# Patient Record
Sex: FEMALE | Race: BLACK OR AFRICAN AMERICAN | Employment: OTHER | URBAN - METROPOLITAN AREA
[De-identification: names, ages, dates, MRNs, and addresses within clinical notes are randomized per-mention and may not be internally consistent; named-entity substitution may affect disease eponyms.]

---

## 2020-07-02 ENCOUNTER — HOSPITAL ENCOUNTER (OUTPATIENT)
Dept: PHYSICAL THERAPY | Age: 75
Discharge: HOME | End: 2020-07-02
Payer: MEDICARE

## 2020-07-02 PROCEDURE — 97161 PT EVAL LOW COMPLEX 20 MIN: CPT

## 2020-07-02 PROCEDURE — 97110 THERAPEUTIC EXERCISES: CPT

## 2020-07-02 NOTE — PROGRESS NOTES
PT DAILY TREATMENT NOTE/LUMBAR EVAL 10-18    Patient Name: Maren Joaquin  Date:2020  : 1945  [x]  Patient  Verified  Payor: [place] MEDICARE / Plan: [place] MEDICARE PART A & B / Product Type: Medicare /    In time:1228  Out time:125  Total Treatment Time (min): 25  Visit #: 1 of 16    Medicare/BCBS Only   Total Timed Codes (min):  25 1:1 Treatment Time:  25     Treatment Area: Lumbar pain [M54.5]  SUBJECTIVE  Pain Level (0-10 scale): 7  []constant []intermittent []improving [x]worsening []no change since onset    Any medication changes, allergies to medications, adverse drug reactions, diagnosis change, or new procedure performed?: [x] No    [] Yes (see summary sheet for update)  Subjective functional status/changes:     Patient has c/c of lateral right thigh and leg pain since May 2020 with no apparent reason for onset. Patient describes pain as strong shooting ache. Pain is worse in PM especially after a busy day. Denies numbness/tingling. Denies popping/clicking. Aggravating factors: worst primarily with standing >10 mins. Alleviating factors: sitting and trunk flexion tasks. Denies red flags: SOB, chest pain, dizziness/lightheadedness, blurred/double vision, HA, chills/fevers, night sweats, change in bowel/bladder control, abdominal pain, difficulty swallowing, slurred speech, unexplained weight gain/loss, nausea, vomiting. PMHx: DM, HTN. Surgical Hx: breast lumpectomy. Social Hx: , single level home, no alcohol, no tobacco, retired. PLOF: unrestricted in standing and walking tolerance. CLOF: standing/walking limited to 10 mins max, unable to grocery shop, moderate difficulty completing household ADLs, especially cooking.   Diagnostic Imaging: plain film x rays lumbar and thoracic DJD, DDD      OBJECTIVE/EXAMINATION    Precautions: none    25 min [x]Eval                  []Re-Eval       25 min Therapeutic Exercise:  [] See flow sheet :   Rationale: increase ROM, increase strength and decrease pain to improve the patients ability to complete ADLs          With   [] TE   [] TA   [] neuro   [] other: Patient Education: [x] Review HEP    [] Progressed/Changed HEP based on:   [] positioning   [] body mechanics   [] transfers   [] heat/ice application    [] other:      Physical Therapy Evaluation - Lumbar Spine    OBJECTIVE  Posture:  Lateral Shift: [] R    [] L     [] +  [] -  Kyphosis: [] Increased [] Decreased   []  WNL  Lordosis:  [] Increased [x] Decreased   [] WNL  Pelvic symmetry: [x] WNL    [] Other:    Gait:  [] Normal     [x] Abnormal: flexed posture, decreased chandler, difficulty rising from sit to stand    Active Movements: [] N/A   [] Too acute   [] Other:  ROM Degrees Comments:pain, area   Forward flexion  95   Fingertips 1 inch from floor   Extension  22    SB right  34    SB left  35    Rotation right  45    Rotation left  40   Mild pain     Repeated Movements: extension positive  Side Glide: negative   Sustained passive positioning test: right posterior quadrant positive    Neuro Screen [x] WNL  Myotome/Dermatome/Reflexes:  Comments:    Dural Mobility:  SLR Supine: [] R    [] L    [] +    [x] -  @ (degrees):   Slump Test: [] R    [] L    [] +    [x] -  @ (degrees):   Prone Knee Bend: [] R    [] L    [] +    [x] -     Palpation  [x] Min  [] Mod  [] Severe    Location: right piriformis  [x] Min  [] Mod  [] Severe    Location: right SI    Strength   L(0-5) R (0-5) N/T   Hip Flexion (L1,2) 5 5 []   Knee Extension (L3,4) 5 4 []   Ankle Dorsiflexion (L4) 5 5 []   Great Toe Extension (L5) 5 5 []   Ankle Plantarflexion (S1) 5 5 []   Knee Flexion (S1,2) 5 5 []   Abdominals 3-  []   Paraspinals 4 4 []   Back Rotators 4 4 []   Gluteus Anthony 4 4 []   Hip Abduction 4 5 []     Special Tests  Lumbar:  Lumb.  Compression: [x] Pos  [] Neg               Lumbar Distraction:   [] Pos  [x] Neg    Quadrant:  [x] Pos  [] Neg   [] Flex  [x] Ext    Sacroilliac:  Gaenslen's: [] R    [] L    [] +    [x] - Compression: [] +    [x] -     Gapping:  [] +    [x] -     Thigh Thrust: [] R    [] L    [] +    [] -          Hip: Hernán:  [x] R    [] L    [x] +    [] -     Scour:  [] R    [] L    [] +    [x] -     Piriformis: [x] R    [] L    [x] +    [] -     Other tests/comments:       Pain Level (0-10 scale) post treatment: 4    ASSESSMENT/Changes in Function: Patient presents with signs/symptoms consistent with xray confirmed lumbar DJD likely resulting in lumbar spinal stenosis and intermittent right LE radiculopathy. Patient presents with decreased trunk AROM, core strength, markedly limited standing tolerance and moderate to severe intermittent radicular pain. Patient will continue to benefit from skilled PT services to modify and progress therapeutic interventions, address functional mobility deficits, address ROM deficits, address strength deficits, analyze and address soft tissue restrictions, analyze and cue movement patterns, analyze and modify body mechanics/ergonomics and assess and modify postural abnormalities to attain remaining goals.      [x]  See Plan of Care  []  See progress note/recertification  []  See Discharge Summary         Progress towards goals / Updated goals:  See POC    PLAN  []  Upgrade activities as tolerated     [x]  Continue plan of care  []  Update interventions per flow sheet       []  Discharge due to:_  []  Other:_      Sangeeta Humphreys PT 7/2/2020  12:32 PM

## 2020-07-02 NOTE — PROGRESS NOTES
[facility] at [address] [place] [address] Drive: [phone]   Fax: [phone]  PLAN OF CARE / [address] [place] [address] FOR PHYSICAL THERAPY SERVICES  Patient Name: Aly Michael : 1945   Medical   Diagnosis: Low back pain [M54.5] Treatment Diagnosis: Low back pain   Onset Date: May 2020     Referral Source: CAITLIN Burns Start of Care [facility]): 2020   Prior Hospitalization: See medical history Provider #: [id number]   Prior Level of Function:  unrestricted in standing and walking tolerance   Comorbidities: DM, HTN   Medications: Verified on Patient Summary List   The Plan of Care and following information is based on the information from the initial evaluation.   ===========================================================================================  Assessment / childers information:  Aly Michael is a 66 y.o.  female who presents with  signs/symptoms consistent with xray confirmed lumbar DJD likely resulting in lumbar spinal stenosis and intermittent right LE radiculopathy. Patient presents with decreased trunk AROM, core strength, markedly limited standing tolerance and moderate to severe intermittent radicular pain.     Patient will continue to benefit from skilled PT services to modify and progress therapeutic interventions, address functional mobility deficits, address ROM deficits, address strength deficits, analyze and address soft tissue restrictions, analyze and cue movement patterns, analyze and modify body mechanics/ergonomics and assess and modify postural abnormalities to attain remaining goals.       Pt instructed in HEP and will f/u in clinic for PT.  ===========================================================================================  Eval Complexity: History MEDIUM  Complexity : 1-2 comorbidities / personal factors will impact the outcome/ POC ;  Examination  LOW Complexity : 1-2 Standardized tests and measures addressing body structure, function, activity limitation and / or participation in recreation ; Presentation LOW Complexity : Stable, uncomplicated ;  Decision Making MEDIUM Complexity : FOTO score of 26-74; : FOTO score = an established functional score where 100 = no disability  Overall Complexity LOW   Problem List: pain affecting function, decrease ROM, decrease strength, impaired gait/ balance, decrease ADL/ functional abilitiies, decrease activity tolerance, decrease flexibility/ joint mobility and decrease transfer abilities   Treatment Plan may include any combination of the following: Therapeutic exercise, Therapeutic activities, Neuromuscular re-education, Physical agent/modality, Gait/balance training, Manual therapy, Aquatic therapy, Patient education, Self Care training and Functional mobility training  Patient / Family readiness to learn indicated by: asking questions, trying to perform skills and interest  Persons(s) to be included in education: patient (P)  Barriers to Learning/Limitations: no  Measures Taken: NA  Patient Goal (s): \"I want to be able to stand and cook a whole meal without the pain. \"   Patient self reported health status: good  Rehabilitation Potential: good  Goals:  Short Term Goals: To be accomplished in 4 weeks:   Patient will report compliance with HEP 1x/day to aid in rehabilitation program.   Status at IE: Patient instructed in and provided written copy of initial Home Exercise Program.   Current: Same as IE      Patient will increase lumbar AROM extension to 30 degrees to aid in completion of ADLs. Status at IE: lumbar extension 22 degrees with increased pain. Current: Same as IE    Long Term Goals: To be accomplished in 8 weeks:   Patient will increase B LE strength to 5/5 MMT throughout to aid in completion of ADLs.    Status at IE: core/abdominal 3-/5, lumbar and gluts 4/5   Current: Same as IE     Patient will report pain no greater than 2/10 throughout entire day to aid in completion of ADLs. Status at IE:0-7/10   Current: Same as IE     Patent will be able to stand for 30 mins to be able to prepare meals. .   Status at IE: standing tolerance <10mins. Current: Same as IE     Patient will improve FOTO (an established functional score where 100 = no disability) score to 57 points to demonstrate improvement in functional status. Status at IE: 37   Current: Same as IE        Frequency / Duration:   Patient to be seen 2  times per week for 8  weeks:  Patient / Caregiver education and instruction: self care and exercises      . Therapist Signature: Angel Villa DPT Date: 0/3/8964   Certification Period: 7/2/2020 to 9/30/2020 Time: 1:36 PM   ===========================================================================================  I certify that the above Physical Therapy Services are being furnished while the patient is under my care. I agree with the treatment plan and certify that this therapy is necessary. Physician Signature:        Date:       Time:     Please sign and return to [facility] or you may fax the signed copy to [phone]. Thank you.

## 2020-07-09 ENCOUNTER — HOSPITAL ENCOUNTER (OUTPATIENT)
Dept: PHYSICAL THERAPY | Age: 75
Discharge: HOME | End: 2020-07-09
Payer: MEDICARE

## 2020-07-09 PROCEDURE — 97110 THERAPEUTIC EXERCISES: CPT

## 2020-07-09 NOTE — PROGRESS NOTES
PT DAILY TREATMENT NOTE    Patient Name: Lluvia Booth  Date:2020  : 1945  [x]  Patient  Verified  Payor: [place] MEDICARE / Plan: VA MEDICARE PART A & B / Product Type: Medicare /    In time: 229  Out time: 315  Total Treatment Time (min): 46  Total Timed Codes (min): 46  1:1 Treatment Time ( only): 42   Visit #: 2 of 16    Treatment Area: Low back pain [M54.5]    SUBJECTIVE  Pain Level (0-10 scale): 6  Any medication changes, allergies to medications, adverse drug reactions, diagnosis change, or new procedure performed?: [x] No    [] Yes (see summary sheet for update)  Subjective functional status/changes:   [] No changes reported  \"I have been doing the exercises at home and they do seem to help decrease the pain. \"    OBJECTIVE    42 min Therapeutic Exercise:  [x] See flow sheet :   Rationale: increase ROM, increase strength and decrease pain to improve the patients ability to complete ADLs  ambulation safety and efficiency in order to improve patient's ability to safely ambulate at home for self care. With   [] TE   [] TA   [] neuro   [] other: Patient Education: [x] Review HEP    [] Progressed/Changed HEP based on:   [] positioning   [] body mechanics   [] transfers   [] heat/ice application    [] other:      Other Objective/Functional Measures: NA     Pain Level (0-10 scale) post treatment: 4    ASSESSMENT/Changes in Function: Patient instructed in and provided written copy of additional exs to HEP. Patient fatigues quickly with gentle exs indicating significant deconditioning. Patient responds well to treatment session. No adverse effects were noted from today's treatment session.      Patient will continue to benefit from skilled PT services to modify and progress therapeutic interventions, address functional mobility deficits, address ROM deficits, address strength deficits, analyze and address soft tissue restrictions, analyze and cue movement patterns, analyze and modify body mechanics/ergonomics, assess and modify postural abnormalities,  and instruct in home and community integration to attain remaining goals. []  See Plan of Care  []  See progress note/recertification  []  See Discharge Summary         Progress towards goals / Updated goals:  Short Term Goals: To be accomplished in 4 weeks:                   Patient will report compliance with HEP 1x/day to aid in rehabilitation program.                   Status at IE: Patient instructed in and provided written copy of initial Home Exercise Program.                   Current: Patient reports daily compliance with initial HEP.   7/9/2020                      Patient will increase lumbar AROM extension to 30 degrees to aid in completion of ADLs. Status at IE: lumbar extension 22 degrees with increased pain. Current: Same as IE     Long Term Goals: To be accomplished in 8 weeks:                   Patient will increase B LE strength to 5/5 MMT throughout to aid in completion of ADLs. Status at IE: core/abdominal 3-/5, lumbar and gluts 4/5                   Current: Same as IE                      Patient will report pain no greater than 2/10 throughout entire day to aid in completion of ADLs. Status at IE:0-7/10                   Current: Same as IE                      Patent will be able to stand for 30 mins to be able to prepare meals. .                   Status at IE: standing tolerance <10mins. Current: Same as IE                      Patient will improve FOTO (an established functional score where 100 = no disability) score to 57 points to demonstrate improvement in functional status.                    Status at IE: 37                   Current: Same as IE       PLAN  []  Upgrade activities as tolerated     [x]  Continue plan of care  []  Update interventions per flow sheet       []  Discharge due to:_  []  Other:_      Brianna Mari PT, DPT 7/9/2020  2:39 PM    Future Appointments   Date Time Provider Eder Tressa   7/13/2020  1:30 PM Abimbola Montanez, PT [facility]   7/15/2020 12:30 PM Abimbola Montanez, PT [facility]   7/20/2020  9:00 AM Abimbola Montanez, PT [facility]   7/23/2020 12:30 PM Mason Tavares, PT [facility]   7/28/2020  1:30 PM Mason Tavares, PT [facility]   7/30/2020  1:30 PM Patricia Arreola, PT [facility]

## 2020-07-13 ENCOUNTER — HOSPITAL ENCOUNTER (OUTPATIENT)
Dept: PHYSICAL THERAPY | Age: 75
Discharge: HOME | End: 2020-07-13
Payer: MEDICARE

## 2020-07-13 PROCEDURE — 97110 THERAPEUTIC EXERCISES: CPT | Performed by: PHYSICAL THERAPIST

## 2020-07-13 NOTE — PROGRESS NOTES
PT DAILY TREATMENT NOTE    Patient Name: Ras Mix  Date:2020  : 1945  [x]  Patient  Verified  Payor: Ramona Watson / Plan: [place] MEDICARE PART A & B / Product Type: Medicare /    In time:1329  Out time:1425  Total Treatment Time (min): 56  Total Timed Codes (min): 56  1:1 Treatment Time ( only): 64   Visit #: 3 of 16    Treatment Area: Low back pain [M54.5]    SUBJECTIVE  Pain Level (0-10 scale): 5 right leg from lateral  Thigh up to lateral hip , no c/o low back localized pain. Any medication changes, allergies to medications, adverse drug reactions, diagnosis change, or new procedure performed?: [x] No    [] Yes (see summary sheet for update)  Subjective functional status/changes:   [] No changes reported  This weekend especially yesterday painful in her lateral leg up to 8-/10 took Aleve some help , no specific help with Knee to chest ex tried . Or sitting flexion against a ball , in past these helped some     OBJECTIVE  56 min Therapeutic Exercise:  [x]? See flow sheet :   Rationale: increase ROM, increase strength and decrease pain to improve the patients ability to complete ADLs  ambulation safety and efficiency in order to improve patient's ability to safely ambulate at home for self care. With   [] TE   [] TA   [] neuro   [] other: Patient Education: [x] Review HEP    [] Progressed/Changed HEP based on:   [] positioning   [] body mechanics   [] transfers   [] heat/ice application    [] other:      Other Objective/Functional Measures: sitting rotation trunk tighter to left , performed 2x 10 reps 5 sec hold to right and noted progressively easier motion to left following ex now approx same and WNL both directions.    No change on leg sx however neither better or worse     Sitting flexion = bilaterally no change in leg sx     pressup 75% no increase in pain or sx , unilateral pressup tight on right side some pain , no pain on left , performed 2x10 reps on left and 3x 20 sec holds ( responded best to reps and noted able to abolish leg sx and increase right unilateral motion as well as pressup mobility )   Pt then perform 10 reps pressups still no pain   And standing extension with no return of pain      Pain Level (0-10 scale) post treatment: 0    ASSESSMENT/Changes in Function: able to abolish leg sx with extension preference so held on flexion knees to chest for now just to fully assess tolerance and response to extension ex. Pt responded best with unilateral pressup , regular pressup and tolerated with no return of sx standing extension. Pt will continue these extension direction ex and reassess response at f/u appt in 2 days. Pt also advised in sitting to use pillow support for upright sit and work on walking as tolerated . Patient will continue to benefit from skilled PT services to modify and progress therapeutic interventions, address functional mobility deficits, address ROM deficits, address strength deficits, analyze and address soft tissue restrictions, analyze and cue movement patterns, analyze and modify body mechanics/ergonomics, assess and modify postural abnormalities and address imbalance/dizziness to attain remaining goals. [x]  See Plan of Care  []  See progress note/recertification  []  See Discharge Summary         Progress towards goals / Updated goals:  Short Term Goals: To be accomplished in 4 weeks:                   [facility] will report compliance with HEP 1x/day to aid in rehabilitation program.                   Status at IE: Patient instructed in and provided written copy of initial Home Exercise Program.                   Current: Patient reports daily compliance with initial HEP.   7/13/2020                      Patient will increase lumbar AROM extension to 30 degrees to aid in completion of ADLs.                    Status at IE: lumbar extension 22 degrees with increased pain.                   Current: 75% pressup with no pain progressing      Long Term Goals: To be accomplished in 8 weeks:                   Patient will increase B LE strength to 5/5 MMT throughout to aid in completion of ADLs.                  Status at IE: core/abdominal 3-/5, lumbar and gluts 4/5                   Current: Same as IE                      Patient will report pain no greater than 2/10 throughout entire day to aid in completion of ADLs.                  Status at IE:0-7/10                   Current: up to 8-9/10 over last 1-2 days                       Patent will be able to stand for 30 mins to be able to prepare meals. .                   Status at IE: standing tolerance <10mins.                  Current: Same as IE                      Patient will improve FOTO (an established functional score where 100 = no disability) score to 57 points to demonstrate improvement in functional status.                    Status at IE: 40                   Current: Same as IE    PLAN  [x]  Upgrade activities as tolerated     [x]  Continue plan of care  []  Update interventions per flow sheet       []  Discharge due to:_  []  Other:_      Kevin De La Garza, PT 7/13/2020  1:30 PM    Future Appointments   Date Time Provider Eder Bustillos   7/15/2020 12:30 PM Paula Goodman, PT [facility]   7/20/2020  9:00 AM Paula Goodman, PT [facility]   7/23/2020 12:30 PM Fatou Santizo, PT [facility]   7/28/2020  1:30 PM Jordin Terry, PT [facility]   7/30/2020  1:30 PM Delmis Zhang, PT, DPT [facility]

## 2020-07-15 ENCOUNTER — HOSPITAL ENCOUNTER (OUTPATIENT)
Dept: PHYSICAL THERAPY | Age: 75
Discharge: HOME | End: 2020-07-15
Payer: MEDICARE

## 2020-07-15 PROCEDURE — 97110 THERAPEUTIC EXERCISES: CPT | Performed by: PHYSICAL THERAPIST

## 2020-07-15 NOTE — PROGRESS NOTES
PT DAILY TREATMENT NOTE    Patient Name: Payton Begin  Date:7/15/2020  : 1945  [x]  Patient  Verified  [place]or: [place] MED[place]ARE / Plan: [place] MEDICARE PART A & B / Product Type: Medicare /    In time:1232 Out time:1320   Total Treatment Time (min): 52  Total Timed Codes (min): 52  1:1 Treatment Time (MC only): 52   Visit #: 4 of 16    Treatment Area: Low back pain [M54.5]    SUBJECTIVE  Pain Level (0-10 scale): 0  Any medication changes, allergies to medications, adverse drug reactions, diagnosis change, or new procedure performed?: [x] No    [] Yes (see summary sheet for update)  Subjective functional status/changes:   [] No changes reported  Pt states felt good after last appt till evening , usually at night when cooking dinner will have pain , feels better when she goes to sit down and rest. Pt able to perform extension ex pressups , unilateral pressups and standing extension on own with HEP and none of them caused her any pain , she did not have pain before starting them so she is not sure if they helped her pain. OBJECTIVE  52 min Therapeutic Exercise:  [x]? ? See flow sheet :   Rationale: increase ROM, increase strength and decrease pain to improve the patients ability to complete ADLs  ambulation safety and efficiency in order to improve patient's ability to safely ambulate at home for self care.      With   [] TE   [] TA   [] neuro   [] other: Patient Education: [x] Review HEP    [] Progressed/Changed HEP based on:   [] positioning   [] body mechanics   [] transfers   [] heat/ice application    [] other:      Other Objective/Functional Measures: approx = sitting trunk rotation 55 deg left , 57 deg right   Supine LTR : 11.5 \" from table ( top leg ) right , 12.5 \" left        Pain Level (0-10 scale) post treatment: 0    ASSESSMENT/Changes in Function: tolerated ex well , no pain with any ex today , able to do piriformis stretch with no discomfort and improved control of bridges and hold post tilt , Patient will continue to benefit from skilled PT services to modify and progress therapeutic interventions, address functional mobility deficits, address ROM deficits, address strength deficits, analyze and address soft tissue restrictions, analyze and cue movement patterns, analyze and modify body mechanics/ergonomics and assess and modify postural abnormalities to attain remaining goals. [x]  See Plan of Care  []  See progress note/recertification  []  See Discharge Summary         Progress towards goals / Updated goals:  Short Term Goals: To be accomplished in 4 weeks:                   PUXMFVG will report compliance with HEP 1x/day to aid in rehabilitation program.                   Status at IE: Patient instructed in and provided written copy of initial Home Exercise Program.                   Current: Patient reports daily compliance with HEP.   7/15/2020 MET                      Patient will increase lumbar AROM extension to 30 degrees to aid in completion of ADLs.                  Status at IE: lumbar extension 22 degrees with increased pain.                   Current: full pressup with arms extended , LTR11.5 \" from table ( top leg ) right , 12.5 \" left, sitting rotation 55-57 deg each,   no pain progressing      Long Term Goals: To be accomplished in 8 weeks:                   Patient will increase B LE strength to 5/5 MMT throughout to aid in completion of ADLs.                  Status at IE: core/abdominal 3-/5, lumbar and gluts 4/5                   Current: improved control with bridging and holding post tilt , progressing                       Patient will report pain no greater than 2/10 throughout entire day to aid in completion of ADLs.                    Status at IE:0-7/10                   Current: up to 8-9/10 over last 1-2 days 7/11, less pain in last couple days 7/15, progressing                       Patent will be able to stand for 30 mins to be able to prepare meals. .                   Status at IE: standing tolerance <10mins.                  Current: Same as IE                      Patient will improve FOTO (an established functional score where 100 = no disability) score to 57 points to demonstrate improvement in functional status.                    Status at IE: 37                   Current:will reassess at visit 5    PLAN  [x]  Upgrade activities as tolerated     [x]  Continue plan of care  []  Update interventions per flow sheet       []  Discharge due to:_  []  Other:_      Kevin De La Garza PT 7/15/2020  12:33 PM    Future Appointments   Date Time Provider Eder Bustillos   7/20/2020  9:00 AM Paula Goodman, [facility]   7/23/2020 12:30 PM [facility]   7/28/2020  1:30 PM Fatou Santizo PT [facility]   7/30/2020  1:30 PM Delmis Zhang, PT, DPT [facility]

## 2020-07-20 ENCOUNTER — HOSPITAL ENCOUNTER (OUTPATIENT)
Dept: PHYSICAL THERAPY | Age: 75
Discharge: HOME | End: 2020-07-20
Payer: MEDICARE

## 2020-07-20 PROCEDURE — 97110 THERAPEUTIC EXERCISES: CPT | Performed by: PHYSICAL THERAPIST

## 2020-07-20 NOTE — PROGRESS NOTES
PT DAILY TREATMENT NOTE    Patient Name: Maren Joaquin  Date:2020  : 1945  [x]  Patient  Verified  Payor: Idolina Babinski / Plan: [place] MEDICARE PART A & B / Product Type: Medicare /    In time:0900  Out time:09  Total Treatment Time (min): 59   Total Timed Codes (min): 59   1:1 Treatment Time (MC only): 61    Visit #: 5 of 16    Treatment Area: Low back pain [M54.5]    SUBJECTIVE  Pain Level (0-10 scale): 0 no leg pain or back pain just some stiffness in her hip  Any medication changes, allergies to medications, adverse drug reactions, diagnosis change, or new procedure performed?: [x] No    [] Yes (see summary sheet for update)  Subjective functional status/changes:   [] No changes reported  Only  night while cooking had some pain lateral leg went and sat for 10 to 15 min then went away. This was only time had pain this weekend went up to 4/10     OBJECTIVE  59 min Therapeutic Exercise:  [x]? ?? See flow sheet :   Rationale: increase ROM, increase strength and decrease pain to improve the patients ability to complete ADLs  ambulation safety and efficiency in order to improve patient's ability to safely ambulate at home for self care. With   [] TE   [] TA   [] neuro   [] other: Patient Education: [x] Review HEP    [] Progressed/Changed HEP based on:   [] positioning   [] body mechanics   [] transfers   [] heat/ice application    [] other:      Other Objective/Functional Measures: sitting trunk rotation 52 deg left , 67 deg right    Following 10 reps rotaiton to right noted left looser up to 65 deg now     Pt issued orange band for HEP SLR     Unilateral pressup right mild stiff , performed 10 x on left     Pain Level (0-10 scale) post treatment: 0    ASSESSMENT/Changes in Function: pt has been having less painful recently and able to abolish pain and hip tightness with extension ex , tolerated all ex without increase pain .  Progressing well with resistance with core ex and bridge height progressing with good control. Patient will continue to benefit from skilled PT services to modify and progress therapeutic interventions, address functional mobility deficits, address ROM deficits, address strength deficits, analyze and address soft tissue restrictions, analyze and cue movement patterns, analyze and modify body mechanics/ergonomics and assess and modify postural abnormalities to attain remaining goals. [x]  See Plan of Care  []  See progress note/recertification  []  See Discharge Summary         Progress towards goals / Updated goals:  Short Term Goals: To be accomplished in 4 weeks:                   NVYNOQE will report compliance with HEP 1x/day to aid in rehabilitation program.                   Status at IE: Patient instructed in and provided written copy of initial Home Exercise Program.                   Current: Patient reports daily compliance with HEP.   7/15/2020 MET                      Patient will increase lumbar AROM extension to 30 degrees to aid in completion of ADLs.                  Status at IE: lumbar extension 22 degrees with increased pain.                   Current: full pressup with arms extended , LTR11.5 \" from table ( top leg ) right , 12.5 \" left, sitting rotation 65-67 deg each,   no pain-  progressing      Long Term Goals: To be accomplished in 8 weeks:                   Patient will increase B LE strength to 5/5 MMT throughout to aid in completion of ADLs.                  Status at IE: core/abdominal 3-/5, lumbar and gluts 4/5                   Current: improved control with bridging and holding post tilt , progressing                       Patient will report pain no greater than 2/10 throughout entire day to aid in completion of ADLs.                    Status at IE:0-7/10                   Current: no more than 4/10 in the last week,   progressing 7/20/20                      Patent will be able to stand for 30 mins to be able to prepare meals. .                   Status at IE: standing tolerance <10mins.                  MRUMSAP: can stand up to 15 min sometimes less in kitchen progressing 7/20/20                       Patient will improve FOTO (an established functional score where 100 = no disability) score to 57 points to demonstrate improvement in functional status.                    Status at IE: 40                   Current:will reassess next visit     PLAN  [x]  Upgrade activities as tolerated     [x]  Continue plan of care  []  Update interventions per flow sheet       []  Discharge due to:_  []  Other:_      Jennifer Sports, PT 7/20/2020  9:15 AM    Future Appointments   Date Time Provider Eder Bustillos   7/23/2020 12:30 PM Yu Gaitan, PT [facility]   7/28/2020  1:30 PM Yu Gaitan PT [facility]   7/30/2020  1:30 PM Alvia Krabbe, PT, DPT [facility]

## 2020-07-21 ENCOUNTER — APPOINTMENT (OUTPATIENT)
Dept: PHYSICAL THERAPY | Age: 75
End: 2020-07-21
Payer: MEDICARE

## 2020-07-28 ENCOUNTER — APPOINTMENT (OUTPATIENT)
Dept: PHYSICAL THERAPY | Age: 75
End: 2020-07-28
Payer: MEDICARE

## 2020-07-30 ENCOUNTER — APPOINTMENT (OUTPATIENT)
Dept: PHYSICAL THERAPY | Age: 75
End: 2020-07-30
Payer: MEDICARE

## 2020-08-11 NOTE — PROGRESS NOTES
[facility] at [address] [place] [address] Drive: [phone]   Fax: [phone]  Discharge Summary  Patient Name: Yomaira Hill : 1945   Medical   Diagnosis: Low back pain [M54.5] Treatment Diagnosis: Low back pain   Onset Date: May 2020     Referral Source: [facility]): 2020   Prior Hospitalization: See medical history Provider #: [id number]   Prior Level of Function: unrestricted in standing and walking tolerance   Comorbidities: DM, HTN   Medications: Verified on Patient Summary List      ===========================================================================================  Assessment / Summary of Care: Yomaira Hill is a 66 y.o.   female who received 5 PT treatments at our facility. Patient then left a voice message stating that her physician wanted her to discontinue PT. Patient is therefore discharged. Below is patient progress towards goals at last attended session.    ===========================================================================================    Plan: Discharge to independent HEP. Short Term Goals: To be accomplished in 4 weeks:                   GGZRSLC will report compliance with HEP 1x/day to aid in rehabilitation program.                   Status at IE: Patient instructed in and provided written copy of initial Home Exercise Program.                   Current: Patient reports daily compliance with HEP.   7/15/2020 MET                      Patient will increase lumbar AROM extension to 30 degrees to aid in completion of ADLs.                    Status at IE: lumbar extension 22 degrees with increased pain.                   Current: full pressup with arms extended , LTR11.5 \" from table ( top leg ) right , 12.5 \" left, sitting rotation 65-67 deg each,   no pain-  progressing      Long Term Goals: To be accomplished in 8 weeks:                   Patient will increase B LE strength to 5/5 MMT throughout to aid in completion of ADLs.                  Status at IE: core/abdominal 3-/5, lumbar and gluts 4/5                   Current: improved control with bridging and holding post tilt , progressing                       Patient will report pain no greater than 2/10 throughout entire day to aid in completion of ADLs.                  Status at IE:0-7/10                   Current: no more than 4/10 in the last week,   progressing 7/20/20                      Patent will be able to stand for 30 mins to be able to prepare meals. .                   Status at IE: standing tolerance <10mins.                  JBVAKKR: can stand up to 15 min sometimes less in kitchen progressing 7/20/20                       Patient will improve FOTO (an established functional score where 100 = no disability) score to 57 points to demonstrate improvement in functional status.                    Status at IE: 40                   Current:will reassess next visit   ===========================================================================================    Therapist Signature: Davin Rg PT, DPT Date: 8/11/2020     Time: 11:34 AM

## 2022-09-28 ENCOUNTER — HOSPITAL ENCOUNTER (OUTPATIENT)
Dept: PHYSICAL THERAPY | Age: 77
Discharge: HOME | End: 2022-09-28
Payer: MEDICARE

## 2022-09-28 PROCEDURE — 97110 THERAPEUTIC EXERCISES: CPT

## 2022-09-28 PROCEDURE — 97161 PT EVAL LOW COMPLEX 20 MIN: CPT

## 2022-09-28 NOTE — PROGRESS NOTES
[facility] at [address] [place] [address] Drive: [phone]   Fax: [phone]  PLAN OF CARE / [address] [place] [address] PHYSICAL THERAPY SERVICES  Patient Name: Federico Manning : 1945   Medical   Diagnosis: Pain in right shoulder [M25.511] Treatment Diagnosis: Right shoulder pain   Onset Date: 2022     Referral Source: Nelson Nixon MD Start of Care [facility]): 2022   Prior Hospitalization: See medical history Provider #: [id number]   Prior Level of Function: Independent in self care and household ADLs. Comorbidities: Diabetes, Htn, OA   Medications: Verified on Patient Summary List   The Plan of Care and following information is based on the information from the initial evaluation.   ===========================================================================================  Assessment / key information:  Federico Manning is a 64 y.o.  female who presents with  MRI confirmed \"massive right rotator cuff tear\". Patient currently exhibits marked decrease in AROM and PROM right shoulder, moderate to severe pain, decreased right shoulder strength, and significantly limited ability to perform self care and household ADLs. Patient will continue to benefit from skilled PT services to modify and progress therapeutic interventions, address functional mobility deficits, address ROM deficits, address strength deficits, analyze and address soft tissue restrictions, analyze and cue movement patterns, analyze and modify body mechanics/ergonomics and assess and modify postural abnormalities to attain remaining goals. .      Pt instructed in HEP and will f/u in clinic for PT.  ===========================================================================================  Eval Complexity: History MEDIUM  Complexity : 1-2 comorbidities / personal factors will impact the outcome/ POC ;  Examination  LOW Complexity : 1-2 Standardized tests and measures addressing body structure, function, activity limitation and / or participation in recreation ; Presentation LOW Complexity : Stable, uncomplicated ;  Decision Making MEDIUM Complexity : FOTO score of 26-74; : FOTO score = an established functional score where 100 = no disability  Overall Complexity LOW   Problem List: pain affecting function, decrease ROM, decrease strength, decrease ADL/ functional abilitiies, decrease activity tolerance, and decrease flexibility/ joint mobility   Treatment Plan may include any combination of the following: Therapeutic exercise, Therapeutic activities, Neuromuscular re-education, Physical agent/modality, Manual therapy, Patient education, Self Care training, Functional mobility training, and Home safety training  Patient / Family readiness to learn indicated by: asking questions, trying to perform skills and interest  Persons(s) to be included in education: patient (P)  Barriers to Learning/Limitations: no  Measures Taken: NA  Patient Goal (s): \"Get back use of my arm. \"   Patient self reported health status: good  Rehabilitation Potential: good  Goals:  Short Term Goals: To be accomplished in 4 weeks:   Patient will report compliance with initial/basic HEP at least 1x/day to aid in rehabilitation program.   Status at IE: Patient instructed in and provided written copy of initial Home Exercise Program.   Current: Same as IE     Patient will display full right  shoulder pain free PROM iall planes to aid in completion of ADLs. Status at IE: flex 108, abd 84, ER 41, IR 36   Current: Same as IE      Long Term Goals: To be accomplished in 8 weeks:   Patient will report compliance with comprehensive HEP a least 3-4x/week to aid in rehabilitation/strengthening program.   Status at IE: Patient instructed in and provided written copy of initial Home Exercise Program.   Current: Same as IE     Patient will display full right  shoulder pain free AROM iall planes to aid in completion of ADLs.    Status at IE: flex 59, abd 66, ER 23, IR 26   Current: Same as IE    Patient will report no pain greater than 1-2/10 with overhead activities to aid in completion of ADLs. Status at IE: 5   Current: Same as IE     Patient will increase right shoulder strength to 5/5 throughout all planes to aid in completion of ADLs. Status at IE: flex 3-, abd 3-, ER 3-   Current: Same as IE     Patient will increase FOTO (an established functional score where 100 = no disability) score to 64 points overall to demonstrate improvement in functional status. Status at IE: 45   Current: Same as IE      Frequency / Duration:   Patient to be seen 2  times per week for 8  weeks:  Patient / Caregiver education and instruction: self care and exercises      . Therapist Signature: Lorraine Matthews DPT Date: 6/06/4704   Certification Period: 9/28/2022 to 12/23/2022 Time: 4:36 PM   ===========================================================================================  I certify that the above Physical Therapy Services are being furnished while the patient is under my care. I agree with the treatment plan and certify that this therapy is necessary. Physician Signature:        Date:       Time:        Bebeto Lundberg MD    Please sign and return to [facility] or you may fax the signed copy to [phone]. Thank you.

## 2022-09-28 NOTE — PROGRESS NOTES
PT DAILY TREATMENT NOTE/SHOULDER EVAL 10-18    Patient Name: Veronica Neff  Date:2022  : 1945  [x]  Patient  Verified  Payor: [place] MEDICARE / Plan: VA MEDICARE PART A & B / Product Type: Medicare /    In time:330  Out time:416  Total Treatment Time (min): 25  Visit #: 1 of 16    Medicare/BCBS Only   Total Timed Codes (min):  25 1:1 Treatment Time:  25       Treatment Area: Pain in right shoulder [M25.511]    SUBJECTIVE  Pain Level (0-10 scale): 5  []constant []intermittent []improving []worsening []no change since onset    Any medication changes, allergies to medications, adverse drug reactions, diagnosis change, or new procedure performed?: [x] No    [] Yes (see summary sheet for update)  Subjective functional status/changes:     Patient has c/c of right shoulder pain since 2022 with no apparent reason for onset. Symptoms did not improve with use of medication and sling. Patient describes pain as constant ache with intermittent sharp pain with movement. Pain is worse in PM. Denies numbness/tingling. Denies popping/clicking. Aggravating factors:reaching above shoulder level. Alleviating factors: rest in supported positions. Denies red flags: SOB, chest pain, dizziness/lightheadedness, blurred/double vision, HA, chills/fevers, night sweats, change in bowel/bladder control, abdominal pain, difficulty swallowing, slurred speech, unexplained weight gain/loss, nausea, vomiting. PMHx: OA, diabetes, HTN. Surgical Hx: none. Social Hx: , single level home, no alcohol, no tobacco, retired. PLOF: regular walking program. CLOF: moderate difficulty dressing upper body, requires assist with all household ADLs and washing hair. Diagnostic Imaging: MRI \"massive rotator cuff tear\". Recent falls Hx:none.     OBJECTIVE/EXAMINATION    Precautions: diabetes    21 min [x]Eval                  []Re-Eval       25 min Therapeutic Exercise:  [x] See flow sheet :   Rationale: increase ROM, increase strength and decrease pain to improve the patients ability to complete ADLs            With   [] TE   [] TA   [] neuro   [] other: Patient Education: [x] Review HEP    [] Progressed/Changed HEP based on:   [] positioning   [] body mechanics   [] transfers   [] heat/ice application    [] other:        Physical Therapy Evaluation - Shoulder    Posture: [] Poor    [] Fair    [] Good    Describe:    ROM:  [] Unable to assess at this time                                           AROM  (PROM)                                               Strength   Left Right  Left Right   Flexion 161 59 (108) Flexion 5 3-   Extension 57 33 (42) Extension 5 4   Scaption/ 66 (84) Scaption/ABD 5 3-   ER @ 0 Degrees 59 23 (41) ER @ 0 Degrees 5 3-   IR @ 60 Degrees 67 26 (36) ER @ 90 Degrees 5 3-   Apley IR T5 Iliac Crest IR @ 90 Degrees 5 4     End Feel / Painful Arc:    Scapulohumeral Control / Rhythm:  Able to eccentrically lower with good control? Left: [x] Yes   [] No     Right: [] Yes   [x] No    Accessory Motions:    Palpation  [x] Min  [] Mod  [] Severe    Location: right greater tuberosity  [] Min  [] Mod  [] Severe    Location:    Optional Tests:  WNL  Sensation Left Right Reflexes Left Right   Biceps (C5)   Biceps (C5)     Macario Radial(C6-7)   Brachioradialis (C6)     Macraio Ulnar(C8-T1)   Triceps (C7)       Adson's Test  [] Pos   [x] Neg Yergason's Test [] Pos   [x] Neg  Vero's Test  [] Pos   [] Neg Buchanan's Sign  [] Pos   [x] Neg  Neer's Test  [] Pos   [x] Neg Clunk Test  [] Pos   [x] Neg  Hawkin's Test  [] Pos   [x] Neg AC Joint  [] Pos   [x] Neg  Speed's Test  [] Pos   [x] Neg SC Joint  [] Pos   [x] Neg  Empty Can  [x] Pos   [] Neg Pectoral Tightness [x] Pos   [] Neg  Anterior Apprehension [] Pos   [x] Neg   Posterior Apprehension [] Pos   [x] Neg      Other Tests / Comments:        Pain Level (0-10 scale) post treatment: 5    ASSESSMENT/Changes in Function: Patient presents with MRI confirmed \"massive right rotator cuff tear\". Patient currently exhibits marked decrease in AROM and PROM right shoulder, moderate to severe pain, decreased right shoulder strength, and significantly limited ability to perform self care and household ADLs. Patient will continue to benefit from skilled PT services to modify and progress therapeutic interventions, address functional mobility deficits, address ROM deficits, address strength deficits, analyze and address soft tissue restrictions, analyze and cue movement patterns, analyze and modify body mechanics/ergonomics and assess and modify postural abnormalities to attain remaining goals.      [x]  See Plan of Care  []  See progress note/recertification  []  See Discharge Summary         Progress towards goals / Updated goals:  See POC    PLAN  []  Upgrade activities as tolerated     [x]  Continue plan of care  []  Update interventions per flow sheet       []  Discharge due to:_  []  Other:_      Ar Ortega, PT 9/28/2022  3:30 PM

## 2022-10-04 ENCOUNTER — HOSPITAL ENCOUNTER (OUTPATIENT)
Dept: PHYSICAL THERAPY | Age: 77
Discharge: HOME | End: 2022-10-04
Payer: MEDICARE

## 2022-10-04 PROCEDURE — 97530 THERAPEUTIC ACTIVITIES: CPT

## 2022-10-04 PROCEDURE — 97110 THERAPEUTIC EXERCISES: CPT

## 2022-10-04 PROCEDURE — 97112 NEUROMUSCULAR REEDUCATION: CPT

## 2022-10-04 NOTE — PROGRESS NOTES
PT DAILY TREATMENT NOTE - [facility]     Patient Name: Vikram Hawthorne  OGXC:  : 1945  [x]  Patient  Verified  [place]or: [place] MED[place] / Plan: [place] MEDICARE PART A & B / Product Type: Medicare /    In time:1450  Out time:1528  Total Treatment Time (min): 38  Total Timed Codes (min): 38   1:1 Treatment Time ( only): 45   Visit #: 2 of 16    Treatment Area: Pain in right shoulder [M25.511]    SUBJECTIVE  Pain Level (0-10 scale): 7  Any medication changes, allergies to medications, adverse drug reactions, diagnosis change, or new procedure performed?: [x] No    [] Yes (see summary sheet for update)  Subjective functional status/changes:   [] No changes reported    Patient reports that she has pain in her right shoulder. OBJECTIVE    20 min Therapeutic Exercise:  [x] See flow sheet :   Rationale: increase ROM, increase strength and decrease pain to improve the patients ability to complete ADLs    8 min Therapeutic Activity:  [x]  See flow sheet :   Rationale: increase ROM, increase strength and improve coordination  to improve the patients ability to complete ADLs     10 min Neuromuscular Re-education:  [x]  See flow sheet :   Rationale: improve coordination, improve balance and increase proprioception  to improve the patients ability to complete ADLs       With   [x] TE   [] TA   [] neuro   [] other: Patient Education: [x] Review HEP    [] Progressed/Changed HEP based on:   [] positioning   [] body mechanics   [] transfers   [] heat/ice application    [] other:      Other Objective/Functional Measures: NA     Pain Level (0-10 scale) post treatment: 6-7    ASSESSMENT/Changes in Function:   Patient responds well to treatment session. Patient required several cues to recall exercise mechanics and parameters. She was challenged with exercise secondary to pain. Reviewed HEP to promote good carryover at home.  No adverse effects were noted from today's treatment session      Patient will continue to benefit from skilled PT services to modify and progress therapeutic interventions, address functional mobility deficits, address ROM deficits, address strength deficits, analyze and address soft tissue restrictions, analyze and cue movement patterns, analyze and modify body mechanics/ergonomics, assess and modify postural abnormalities, address imbalance and instruct in home and community integration to attain remaining goals. []  See Plan of Care  []  See progress note/recertification  []  See Discharge Summary         Progress towards goals / Updated goals:  Short Term Goals: To be accomplished in 4 weeks:                   Patient will report compliance with initial/basic HEP at least 1x/day to aid in rehabilitation program.                   Status at IE: Patient instructed in and provided written copy of initial Home Exercise Program.                   Current: In-progress, reviewed HEP, 10/4/2022                      Patient will display full right  shoulder pain free PROM iall planes to aid in completion of ADLs. Status at IE: flex 108, abd 84, ER 41, IR 36                   Current: Same as IE     Long Term Goals: To be accomplished in 8 weeks:                   Patient will report compliance with comprehensive HEP a least 3-4x/week to aid in rehabilitation/strengthening program.                   Status at IE: Patient instructed in and provided written copy of initial Home Exercise Program.                   Current: Same as IE                      Patient will display full right  shoulder pain free AROM iall planes to aid in completion of ADLs. Status at IE: flex 59, abd 66, ER 23, IR 26                   Current: Same as IE     Patient will report no pain greater than 1-2/10 with overhead activities to aid in completion of ADLs.                    Status at IE: 5                   Current: Same as IE                      Patient will increase right shoulder strength to 5/5 throughout all planes to aid in completion of ADLs. Status at IE: flex 3-, abd 3-, ER 3-                   Current: Same as IE                      Patient will increase FOTO (an established functional score where 100 = no disability) score to 64 points overall to demonstrate improvement in functional status.                     Status at IE: 45                   Current: Same as IE    PLAN  []  Upgrade activities as tolerated     [x]  Continue plan of care  []  Update interventions per flow sheet       []  Discharge due to:_  []  Other:_      Maria M Garcia PT, DPT 10/4/2022  2:58 PM    Future Appointments   Date Time Provider Eder Bustillos   10/6/2022 11:45 AM Maudie Fleischer, PTA [facility]   10/11/2022 11:00 AM Eric Hughes PT [facility]   10/13/2022  2:00 PM Maudie Fleischer, PTA [facility]   10/18/2022 11:45 AM Eric Hughes PT [facility]   10/20/2022 11:45 AM Maudie Fleischer, PTA [facility]   10/24/2022  2:45 PM Sim Elkins PT [facility]   10/27/2022 11:45 AM Jj Arreola PT [facility]

## 2022-10-06 ENCOUNTER — HOSPITAL ENCOUNTER (OUTPATIENT)
Dept: PHYSICAL THERAPY | Age: 77
Discharge: HOME | End: 2022-10-06
Payer: MEDICARE

## 2022-10-06 PROCEDURE — 97110 THERAPEUTIC EXERCISES: CPT

## 2022-10-06 PROCEDURE — 97530 THERAPEUTIC ACTIVITIES: CPT

## 2022-10-06 PROCEDURE — 97140 MANUAL THERAPY 1/> REGIONS: CPT

## 2022-10-06 PROCEDURE — 97016 VASOPNEUMATIC DEVICE THERAPY: CPT

## 2022-10-06 NOTE — PROGRESS NOTES
PT DAILY TREATMENT NOTE    Patient Name: Patsey Dancer  WXNI:  : 1945  [x]  Patient  Verified  Payor: [place] MEDICARE / Plan: [place] MEDICARE PART A & B / Product Type: Medicare /    In time:1203  Out time:100  Total Treatment Time (min): 57  Total Timed Codes (min): 47  1:1 Treatment Time (MC/BCBS only): 52   Visit #: 3 of 16    Treatment Dx: Pain in right shoulder [M25.511]    SUBJECTIVE  Pain Level (0-10 scale): 7  Any medication changes, allergies to medications, adverse drug reactions, diagnosis change, or new procedure performed?: [x] No    [] Yes (see summary sheet for update)  Subjective functional status/changes:   [] No changes reported  Patient reports of sharp pain with right shoulder.     OBJECTIVE    Modalities Rationale:     decrease edema, decrease inflammation, and decrease pain to improve patient's ability to return PLOF   min [] Estim, type/location:                                      []  att     []  unatt     []  w/US     []  w/ice    []  w/heat    min []  Mechanical Traction: type/lbs                   []  pro   []  sup   []  int   []  cont    []  before manual    []  after manual    min []  Ultrasound, settings/location:      min []  Iontophoresis w/ dexamethasone, location:                                               []  take home patch       []  in clinic    min []  Ice     []  Heat    location/position:    10 min [x]  Vasopneumatic Device, press/temp: Right shoulder/ low/34 degrees   If using vaso (only need to measure limb vaso being performed on)      pre-treatment girth : 47 cm      post-treatment girth : 45 cm      measured at (landmark location) :  acromion    min []  Other:    [x] Skin assessment post-treatment (if applicable):    [x]  intact    []  redness- no adverse reaction                  []redness - adverse reaction:      20 min Therapeutic Exercise:  [x] See flow sheet :   Rationale: increase ROM, increase strength and decrease pain to improve the patients ability to complete ADLs     17 min Therapeutic Activity:  [x]  See flow sheet :   Rationale: increase ROM, increase strength and improve coordination  to improve the patients ability to complete ADLs     10 min Manual Therapy:  right shoulder PROM flexion and scapular    Rationale: decrease pain, increase ROM, increase tissue extensibility, and increase postural awareness to improve the patients ability to return PLOF  The manual therapy interventions were performed at a separate and distinct time from the therapeutic activities interventions. With   [] TE   [] TA   [] neuro   [] other: Patient Education: [x] Review HEP    [] Progressed/Changed HEP based on:   [] positioning   [x] body mechanics   [] transfers   [x] heat/ice application    [] other:      Other Objective/Functional Measures: NA     Pain Level (0-10 scale) post treatment: 0    ASSESSMENT/Changes in Function: Patient tolerated treatment session fairly well today. Patient was treated with gentle therapeutic exercises. Patient demonstrated right shoulder guarding with exercises due to pain. Cues to maintain good posture and self pace with each exercise to prevent increase pain. Patient continues to make steady progress toward goals and would benefit from continued skilled PT intervention to address remaining deficits outlined in goals below. Patient will continue to benefit from skilled PT services to modify and progress therapeutic interventions, address functional mobility deficits, address ROM deficits, address strength deficits, analyze and address soft tissue restrictions, analyze and cue movement patterns, analyze and modify body mechanics/ergonomics, and assess and modify postural abnormalities to attain remaining goals. [x]  See Plan of Care  []  See progress note/recertification  []  See Discharge Summary         Progress towards goals / Updated goals:  Short Term Goals:  To be accomplished in 4 weeks:                   Patient will report compliance with initial/basic HEP at least 1x/day to aid in rehabilitation program.                   Status at IE: Patient instructed in and provided written copy of initial Home Exercise Program.                   Current: In-progress, reviewed HEP, 10/4/2022                      Patient will display full right  shoulder pain free PROM iall planes to aid in completion of ADLs. Status at IE: flex 108, abd 84, ER 41, IR 36                   Current: Same as IE     Long Term Goals: To be accomplished in 8 weeks:                   Patient will report compliance with comprehensive HEP a least 3-4x/week to aid in rehabilitation/strengthening program.                   Status at IE: Patient instructed in and provided written copy of initial Home Exercise Program.                   Current: Same as IE                      Patient will display full right  shoulder pain free AROM iall planes to aid in completion of ADLs. Status at IE: flex 59, abd 66, ER 23, IR 26                   Current: Same as IE     Patient will report no pain greater than 1-2/10 with overhead activities to aid in completion of ADLs. Status at IE: 5                   Current 10/6/22: 7/10 pain at worst. Regression                      Patient will increase right shoulder strength to 5/5 throughout all planes to aid in completion of ADLs. Status at IE: flex 3-, abd 3-, ER 3-                   Current: Same as IE                      Patient will increase FOTO (an established functional score where 100 = no disability) score to 64 points overall to demonstrate improvement in functional status.                     Status at IE: 45                   Current: Same as IE       PLAN  []  Upgrade activities as tolerated     [x]  Continue plan of care  []  Update interventions per flow sheet       []  Discharge due to:_  []  Other:_      Marissa Litter Magali Arciniega PTA 10/6/2022  10:31 AM    Future Appointments   Date Time Provider Eder Tressa   10/6/2022 11:45 AM Ted Cameron PTA [facility]   10/11/2022 11:00 AM Oleg Rogers, PT [facility]   10/13/2022  2:00 PM Ted Cameron PTA [facility]   10/18/2022 11:45 AM Oleg Rogers, PT [facility]   10/20/2022 11:45 AM Ted Cameron PTA [facility]   10/24/2022  2:45 PM Santana Wiggins, PT [facility]   10/27/2022 11:45 AM Salima Arreola, PT [facility]

## 2022-10-11 ENCOUNTER — APPOINTMENT (OUTPATIENT)
Dept: PHYSICAL THERAPY | Age: 77
End: 2022-10-11
Payer: MEDICARE

## 2022-10-11 ENCOUNTER — TELEPHONE (OUTPATIENT)
Dept: PHYSICAL THERAPY | Age: 77
End: 2022-10-11

## 2022-10-13 ENCOUNTER — HOSPITAL ENCOUNTER (OUTPATIENT)
Dept: PHYSICAL THERAPY | Age: 77
Discharge: HOME | End: 2022-10-13
Payer: MEDICARE

## 2022-10-13 PROCEDURE — 97110 THERAPEUTIC EXERCISES: CPT

## 2022-10-13 PROCEDURE — 97530 THERAPEUTIC ACTIVITIES: CPT

## 2022-10-13 PROCEDURE — 97140 MANUAL THERAPY 1/> REGIONS: CPT

## 2022-10-13 PROCEDURE — 97016 VASOPNEUMATIC DEVICE THERAPY: CPT

## 2022-10-13 NOTE — PROGRESS NOTES
PT DAILY TREATMENT NOTE    Patient Name: Vikram Hawthorne  RFAH:  : 1945  [x]  Patient  Verified  Payor: [place] MEDICARE / Plan: [place] MEDICARE PART A & B / Product Type: Medicare /    In time:207  Out time:255  Total Treatment Time (min): 48  Total Timed Codes (min): 38  1:1 Treatment Time (MC/BCBS only): 38   Visit #: 4 of 16    Treatment Dx: Pain in right shoulder [M25.511]    SUBJECTIVE  Pain Level (0-10 scale): 8  Any medication changes, allergies to medications, adverse drug reactions, diagnosis change, or new procedure performed?: [x] No    [] Yes (see summary sheet for update)  Subjective functional status/changes:   [] No changes reported  Patient reports of increased right shoulder pain.      OBJECTIVE    Modalities Rationale:     decrease edema, decrease inflammation, and decrease pain to improve patient's ability to return PLOF                min [] Estim, type/location:                                                            []  att     []  unatt     []  w/US     []  w/ice    []  w/heat     min []  Mechanical Traction: type/lbs                    []  pro   []  sup   []  int   []  cont    []  before manual    []  after manual     min []  Ultrasound, settings/location:        min []  Iontophoresis w/ dexamethasone, location:                                                []  take home patch       []  in clinic     min []  Ice     []  Heat    location/position:     10 min [x]  Vasopneumatic Device, press/temp: Right shoulder/ low/34 degrees   If using vaso (only need to measure limb vaso being performed on)      pre-treatment girth : 47 cm      post-treatment girth : 46 cm      measured at (landmark location) :  acromion     min []  Other:     [x] Skin assessment post-treatment (if applicable):    [x]  intact    []  redness- no adverse reaction                  []redness - adverse reaction:        15 min Therapeutic Exercise:  [x] See flow sheet :   Rationale: increase ROM, increase strength and decrease pain to improve the patients ability to complete ADLs     13 min Therapeutic Activity:  [x]  See flow sheet :   Rationale: increase ROM, increase strength and improve coordination  to improve the patients ability to complete ADLs     10 min Manual Therapy:  right shoulder PROM flexion and scapular   Rationale: decrease pain, increase ROM, increase tissue extensibility, and increase postural awareness to improve the patients ability to return PLOF  The manual therapy interventions were performed at a separate and distinct time from the therapeutic activities interventions. With   [] TE   [] TA   [] neuro   [] other: Patient Education: [x] Review HEP    [] Progressed/Changed HEP based on:   [] positioning   [] body mechanics   [] transfers   [x] heat/ice application    [] other:      Other Objective/Functional Measures: NA     Pain Level (0-10 scale) post treatment: 8    ASSESSMENT/Changes in Function:   Patient tolerated treatment session fairly well today. Patient challenged with wand exercises. Patient demonstrated muscle guarding with right shoulder due to pain. Cues with good posture to correct compensation. Patient continues to make slow progress toward goals and would benefit from continued skilled PT intervention to address remaining deficits outlined in goals below. Patient will continue to benefit from skilled PT services to modify and progress therapeutic interventions, address functional mobility deficits, address ROM deficits, address strength deficits, analyze and address soft tissue restrictions, analyze and cue movement patterns, analyze and modify body mechanics/ergonomics, and assess and modify postural abnormalities to attain remaining goals. [x]  See Plan of Care  []  See progress note/recertification  []  See Discharge Summary         Progress towards goals / Updated goals:  Short Term Goals:  To be accomplished in 4 weeks:                   Patient will report compliance with initial/basic HEP at least 1x/day to aid in rehabilitation program.                   Status at IE: Patient instructed in and provided written copy of initial Home Exercise Program.                   Current: In-progress, reviewed HEP, 10/4/2022                      Patient will display full right  shoulder pain free PROM iall planes to aid in completion of ADLs. Status at IE: flex 108, abd 84, ER 41, IR 36                   Current: Same as IE     Long Term Goals: To be accomplished in 8 weeks:                   Patient will report compliance with comprehensive HEP a least 3-4x/week to aid in rehabilitation/strengthening program.                   Status at IE: Patient instructed in and provided written copy of initial Home Exercise Program.                   Current: Same as IE                      Patient will display full right  shoulder pain free AROM iall planes to aid in completion of ADLs. Status at IE: flex 59, abd 66, ER 23, IR 26                   Current: Same as IE     Patient will report no pain greater than 1-2/10 with overhead activities to aid in completion of ADLs. Status at IE: 5                   Current 10/6/22: 7/10 pain at worst. Regression                      Patient will increase right shoulder strength to 5/5 throughout all planes to aid in completion of ADLs. Status at IE: flex 3-, abd 3-, ER 3-                   Current 10/13/22: right shoulder flexion, extension, and ER MMT 3-/5. Slow progression                      Patient will increase FOTO (an established functional score where 100 = no disability) score to 64 points overall to demonstrate improvement in functional status.                     Status at IE: 45                   Current: Same as IE       PLAN  []  Upgrade activities as tolerated     [x]  Continue plan of care  []  Update interventions per flow sheet       []  Discharge due to:_  [] Other:_      Luca Espinoza PTA 10/13/2022  9:37 AM    Future Appointments   Date Time Provider Eder Tressa   10/13/2022  2:00 PM BENSON Chavez[facility]PTPARDE[facility]   10/18/2022 11:45 [facility]   10/20/2022 11:45 AM BENSON ChavezHPTPARDEEP [facility]   10/24/2022  2:45 PM BENSON ChavezHPTPARDEEP [facility]   10/27/2022 11:45 AM Edna Arreola, PT [facility]

## 2022-10-18 ENCOUNTER — APPOINTMENT (OUTPATIENT)
Dept: PHYSICAL THERAPY | Age: 77
End: 2022-10-18
Payer: MEDICARE

## 2022-10-18 ENCOUNTER — TELEPHONE (OUTPATIENT)
Dept: PHYSICAL THERAPY | Age: 77
End: 2022-10-18

## 2022-10-20 ENCOUNTER — HOSPITAL ENCOUNTER (OUTPATIENT)
Dept: PHYSICAL THERAPY | Age: 77
Discharge: HOME | End: 2022-10-20
Payer: MEDICARE

## 2022-10-20 PROCEDURE — 97530 THERAPEUTIC ACTIVITIES: CPT

## 2022-10-20 PROCEDURE — 97110 THERAPEUTIC EXERCISES: CPT

## 2022-10-20 PROCEDURE — 97140 MANUAL THERAPY 1/> REGIONS: CPT

## 2022-10-20 NOTE — PROGRESS NOTES
PT DAILY TREATMENT NOTE    Patient Name: Diamond Loja  UQDE:  : 1945  [x]  Patient  Verified  Payor: [place] MEDICARE / Plan: [place] MEDICARE PART A & B / Product Type: Medicare /    In time:1150  Out time:1237  Total Treatment Time (min): 47  Total Timed Codes (min): 47  1:1 Treatment Time (MC/BCBS only): 52   Visit #: 5 of 16    Treatment Dx: Pain in right shoulder [M25.511]    SUBJECTIVE  Pain Level (0-10 scale): 5  Any medication changes, allergies to medications, adverse drug reactions, diagnosis change, or new procedure performed?: [x] No    [] Yes (see summary sheet for update)  Subjective functional status/changes:   [] No changes reported  Patient reports of reduce right shoulder pain today. OBJECTIVE     20 min Therapeutic Exercise:  [x] See flow sheet :   Rationale: increase ROM, increase strength and decrease pain to improve the patients ability to complete ADLs     17 min Therapeutic Activity:  [x]  See flow sheet :   Rationale: increase ROM, increase strength and improve coordination  to improve the patients ability to complete ADLs     10 min Manual Therapy:  right shoulder PROM flexion and scapular   Rationale: decrease pain, increase ROM, increase tissue extensibility, and increase postural awareness to improve the patients ability to return PLOF  The manual therapy interventions were performed at a separate and distinct time from the therapeutic activities interventions. With   [] TE   [] TA   [] neuro   [] other: Patient Education: [x] Review HEP    [] Progressed/Changed HEP based on:   [] positioning   [] body mechanics   [] transfers   [] heat/ice application    [] other:      Other Objective/Functional Measures: NA     Pain Level (0-10 scale) post treatment: 5    ASSESSMENT/Changes in Function:   Patient tolerated treatment session well today. Patient improved with therapeutic exercises with fewer shorter recovery time.  Patient demonstrated increase right shoulder ROM with less compensation and cues with posture correction. However, right shoulder flexion continues to be challenging for patient. Patient continues to make steady progress toward goals and would benefit from continued skilled PT intervention to address remaining deficits outlined in goals below. Patient will continue to benefit from skilled PT services to modify and progress therapeutic interventions, address functional mobility deficits, address ROM deficits, address strength deficits, analyze and address soft tissue restrictions, analyze and cue movement patterns, analyze and modify body mechanics/ergonomics, and assess and modify postural abnormalities to attain remaining goals. [x]  See Plan of Care  []  See progress note/recertification  []  See Discharge Summary         Progress towards goals / Updated goals:  Short Term Goals: To be accomplished in 4 weeks:                   Patient will report compliance with initial/basic HEP at least 1x/day to aid in rehabilitation program.                   Status at IE: Patient instructed in and provided written copy of initial Home Exercise Program.                   Current: In-progress, reviewed HEP, 10/4/2022                      Patient will display full right  shoulder pain free PROM iall planes to aid in completion of ADLs. Status at IE: flex 108, abd 84, ER 41, IR 36                   Current: Same as IE     Long Term Goals: To be accomplished in 8 weeks:                   Patient will report compliance with comprehensive HEP a least 3-4x/week to aid in rehabilitation/strengthening program.                   Status at IE: Patient instructed in and provided written copy of initial Home Exercise Program.                   Current: Same as IE                      Patient will display full right  shoulder pain free AROM iall planes to aid in completion of ADLs.                    Status at IE: flex 59, abd 66, ER 23, IR 26 Current 10/20/22: AAROM flex 70, abd 66, IR 30. IN PROGRESS     Patient will report no pain greater than 1-2/10 with overhead activities to aid in completion of ADLs. Status at IE: 5                   Current 10/6/22: 7/10 pain at worst. Regression                      Patient will increase right shoulder strength to 5/5 throughout all planes to aid in completion of ADLs. Status at IE: flex 3-, abd 3-, ER 3-                   Current 10/13/22: right shoulder flexion, extension, and ER MMT 3-/5. Slow progression                      Patient will increase FOTO (an established functional score where 100 = no disability) score to 64 points overall to demonstrate improvement in functional status.                     Status at IE: 39                   Current: Same as IE       PLAN  []  Upgrade activities as tolerated     [x]  Continue plan of care  []  Update interventions per flow sheet       []  Discharge due to:_  []  Other:_      Alden Hutton PTA 10/20/2022  9:32 AM    Future Appointments   Date Time Provider Eder Bustillos   10/20/2022 11:45 AM BENSON Huffman [facility]   10/24/2022  2:45 PM BENSON Huffman [address] [facility]   10/27/2022 11:45 AM LINCOLN Gibson [facility]

## 2022-10-20 NOTE — PROGRESS NOTES
PT DAILY TREATMENT NOTE    Patient Name: Joseph Adkins  EUQI:  : 1945  [x]  Patient  Verified  Payor: [place] MEDICARE / Plan: [place] MEDICARE PART A & B / Product Type: Medicare /    In time:1150  Out time:1237  Total Treatment Time (min): 47  Total Timed Codes (min): 47  1:1 Treatment Time (MC/BCBS only): 52   Visit #: 5 of 16    Treatment Dx: Pain in right shoulder [M25.511]    SUBJECTIVE  Pain Level (0-10 scale): 5  Any medication changes, allergies to medications, adverse drug reactions, diagnosis change, or new procedure performed?: [x] No    [] Yes (see summary sheet for update)  Subjective functional status/changes:   [] No changes reported  Patient reports of reduce right shoulder pain today. OBJECTIVE     20 min Therapeutic Exercise:  [x] See flow sheet :   Rationale: increase ROM, increase strength and decrease pain to improve the patients ability to complete ADLs     17 min Therapeutic Activity:  [x]  See flow sheet :   Rationale: increase ROM, increase strength and improve coordination  to improve the patients ability to complete ADLs     10 min Manual Therapy:  right shoulder PROM flexion and scapular   Rationale: decrease pain, increase ROM, increase tissue extensibility, and increase postural awareness to improve the patients ability to return PLOF  The manual therapy interventions were performed at a separate and distinct time from the therapeutic activities interventions. With   [] TE   [] TA   [] neuro   [] other: Patient Education: [x] Review HEP    [] Progressed/Changed HEP based on:   [] positioning   [] body mechanics   [] transfers   [] heat/ice application    [] other:      Other Objective/Functional Measures: NA     Pain Level (0-10 scale) post treatment: 5    ASSESSMENT/Changes in Function:   Patient tolerated treatment session well today. Patient improved with therapeutic exercises with fewer shorter recovery time.  Patient demonstrated increase right shoulder ROM with less compensation and cues with posture correction. Added walk away and wall wash to increase shoulder flexion. However, right shoulder flexion continues to be challenging for patient. Patient continues to make steady progress toward goals and would benefit from continued skilled PT intervention to address remaining deficits outlined in goals below. Patient will continue to benefit from skilled PT services to modify and progress therapeutic interventions, address functional mobility deficits, address ROM deficits, address strength deficits, analyze and address soft tissue restrictions, analyze and cue movement patterns, analyze and modify body mechanics/ergonomics, and assess and modify postural abnormalities to attain remaining goals. [x]  See Plan of Care  []  See progress note/recertification  []  See Discharge Summary         Progress towards goals / Updated goals:  Short Term Goals: To be accomplished in 4 weeks:                   Patient will report compliance with initial/basic HEP at least 1x/day to aid in rehabilitation program.                   Status at IE: Patient instructed in and provided written copy of initial Home Exercise Program.                   Current: In-progress, reviewed HEP, 10/4/2022                      Patient will display full right  shoulder pain free PROM iall planes to aid in completion of ADLs. Status at IE: flex 108, abd 84, ER 41, IR 36                   Current: Same as IE     Long Term Goals: To be accomplished in 8 weeks:                   Patient will report compliance with comprehensive HEP a least 3-4x/week to aid in rehabilitation/strengthening program.                   Status at IE: Patient instructed in and provided written copy of initial Home Exercise Program.                   Current: Same as IE                      Patient will display full right  shoulder pain free AROM iall planes to aid in completion of ADLs. Status at IE: flex 59, abd 66, ER 23, IR 26                   Current 10/20/22: AAROM flex 70, abd 66, IR 30. IN PROGRESS     Patient will report no pain greater than 1-2/10 with overhead activities to aid in completion of ADLs. Status at IE: 5                   Current 10/6/22: 7/10 pain at worst. Regression                      Patient will increase right shoulder strength to 5/5 throughout all planes to aid in completion of ADLs. Status at IE: flex 3-, abd 3-, ER 3-                   Current 10/13/22: right shoulder flexion, extension, and ER MMT 3-/5. Slow progression                      Patient will increase FOTO (an established functional score where 100 = no disability) score to 64 points overall to demonstrate improvement in functional status.                     Status at IE: 39                   Current: Same as IE       PLAN  []  Upgrade activities as tolerated     [x]  Continue plan of care  []  Update interventions per flow sheet       []  Discharge due to:_  []  Other:_      Jon Burns PTA 10/20/2022  9:32 AM    Future Appointments   Date Time Provider Eder Bustillos   10/24/2022  2:45 PM Zahra Erwin PTA [facility]   10/27/2022 11:45 AM Tobias Middleton, PT [facility]

## 2022-10-24 ENCOUNTER — HOSPITAL ENCOUNTER (OUTPATIENT)
Dept: PHYSICAL THERAPY | Age: 77
Discharge: HOME | End: 2022-10-24
Payer: MEDICARE

## 2022-10-24 PROCEDURE — 97110 THERAPEUTIC EXERCISES: CPT

## 2022-10-24 PROCEDURE — 97140 MANUAL THERAPY 1/> REGIONS: CPT

## 2022-10-24 PROCEDURE — 97530 THERAPEUTIC ACTIVITIES: CPT

## 2022-10-24 PROCEDURE — 97016 VASOPNEUMATIC DEVICE THERAPY: CPT

## 2022-10-24 NOTE — PROGRESS NOTES
PT DAILY TREATMENT NOTE    Patient Name: Vikram Hawthorne  FOIU:  : 1945  [x]  Patient  Verified  Payor: Jonathan Navarrete / Plan: [place] MEDICARE PART A & B / Product Type: Medicare /    In time:246  Out time:340  Total Treatment Time (min): 54  Total Timed Codes (min): 44  1:1 Treatment Time (MC/BCBS only): 40   Visit #: 6 of 16    Treatment Dx: Pain in right shoulder [M25.511]    SUBJECTIVE  Pain Level (0-10 scale): 5  Any medication changes, allergies to medications, adverse drug reactions, diagnosis change, or new procedure performed?: [x] No    [] Yes (see summary sheet for update)  Subjective functional status/changes:   [] No changes reported  Patient reports of no changes since last visit.      OBJECTIVE    Modalities Rationale:     decrease edema, decrease inflammation, decrease pain, and decrease pain  to improve patient's ability to return PLOF   min [] Estim, type/location:                                      []  att     []  unatt     []  w/US     []  w/ice    []  w/heat    min []  Mechanical Traction: type/lbs                   []  pro   []  sup   []  int   []  cont    []  before manual    []  after manual    min []  Ultrasound, settings/location:      min []  Iontophoresis w/ dexamethasone, location:                                               []  take home patch       []  in clinic    min []  Ice     []  Heat    location/position:    10 min [x]  Vasopneumatic Device, press/temp: Right shoulder/ low/34 degrees   If using vaso (only need to measure limb vaso being performed on)      pre-treatment girth : 42 cm      post-treatment girth : 40 cm      measured at (landmark location) :  Acromion    min []  Other:    [x] Skin assessment post-treatment (if applicable):    [x]  intact    []  redness- no adverse reaction                  []redness - adverse reaction:         15 min Therapeutic Exercise:  [x] See flow sheet :   Rationale: increase ROM, increase strength and decrease pain to improve the patients ability to complete ADLs     15 min Therapeutic Activity:  [x]  See flow sheet :   Rationale: increase ROM, increase strength and improve coordination  to improve the patients ability to complete ADLs     14 min Manual Therapy:  right shoulder PROM flexion and scapular   Rationale: decrease pain, increase ROM, increase tissue extensibility, and increase postural awareness to improve the patients ability to return PLOF  The manual therapy interventions were performed at a separate and distinct time from the therapeutic activities interventions. With   [] TE   [] TA   [] neuro   [] other: Patient Education: [x] Review HEP    [] Progressed/Changed HEP based on:   [] positioning   [] body mechanics   [] transfers   [] heat/ice application    [] other:      Other Objective/Functional Measures: NA     Pain Level (0-10 scale) post treatment: 5    ASSESSMENT/Changes in Function:   Patient tolerated treatment session well today. Advanced patient to UBE and increased repetitions with therapeutic exercises today. Patient demonstrate good tolerance with UBE. Patient continues to make slow progress toward goals and would benefit from continued skilled PT intervention to address remaining deficits outlined in goals below. Patient will continue to benefit from skilled PT services to modify and progress therapeutic interventions, address functional mobility deficits, address ROM deficits, address strength deficits, analyze and address soft tissue restrictions, analyze and cue movement patterns, analyze and modify body mechanics/ergonomics, and assess and modify postural abnormalities to attain remaining goals. [x]  See Plan of Care  []  See progress note/recertification  []  See Discharge Summary         Progress towards goals / Updated goals:  Short Term Goals:  To be accomplished in 4 weeks:                   Patient will report compliance with initial/basic HEP at least 1x/day to aid in rehabilitation program.                   Status at IE: Patient instructed in and provided written copy of initial Home Exercise Program.  Current  10/24/22: patient reports of compliant with HEP every other day. IN PROGRESS                      Patient will display full right  shoulder pain free PROM iall planes to aid in completion of ADLs. Status at IE: flex 108, abd 84, ER 41, IR 36                   Current: Same as IE     Long Term Goals: To be accomplished in 8 weeks:                   Patient will report compliance with comprehensive HEP a least 3-4x/week to aid in rehabilitation/strengthening program.                   Status at IE: Patient instructed in and provided written copy of initial Home Exercise Program.                   Current: Same as IE                      Patient will display full right  shoulder pain free AROM iall planes to aid in completion of ADLs. Status at IE: flex 59, abd 66, ER 23, IR 26                   Current 10/20/22: AAROM flex 70, abd 66, IR 30. IN PROGRESS     Patient will report no pain greater than 1-2/10 with overhead activities to aid in completion of ADLs. Status at IE: 5                   Current 10/6/22: 7/10 pain at worst. Regression                      Patient will increase right shoulder strength to 5/5 throughout all planes to aid in completion of ADLs. Status at IE: flex 3-, abd 3-, ER 3-                   Current 10/13/22: right shoulder flexion, extension, and ER MMT 3-/5. Slow progression                      Patient will increase FOTO (an established functional score where 100 = no disability) score to 64 points overall to demonstrate improvement in functional status.                     Status at IE: 45                   Current: Same as IE       PLAN  []  Upgrade activities as tolerated     [x]  Continue plan of care  []  Update interventions per flow sheet       []  Discharge due to:_  []  Other:_      Mike Monahan PTA 10/24/2022  10:11 AM    Future Appointments   Date Time Provider Eder Tressa   10/24/2022  2:45 PM BENSON Gaines [facility]   10/27/2022 11:45 AM Vaishali Arreola, LINCOLN CLEMENS [facility]

## 2022-10-27 ENCOUNTER — HOSPITAL ENCOUNTER (OUTPATIENT)
Dept: PHYSICAL THERAPY | Age: 77
Discharge: HOME | End: 2022-10-27
Payer: MEDICARE

## 2022-10-27 PROCEDURE — 97016 VASOPNEUMATIC DEVICE THERAPY: CPT

## 2022-10-27 PROCEDURE — 97110 THERAPEUTIC EXERCISES: CPT

## 2022-10-27 PROCEDURE — 97530 THERAPEUTIC ACTIVITIES: CPT

## 2022-10-27 PROCEDURE — 97140 MANUAL THERAPY 1/> REGIONS: CPT

## 2022-10-27 NOTE — PROGRESS NOTES
[facility] at [address] [place] [address]  Phone: [phone]   Fax: [phone]    Discharge Summary    Patient name: Vikram Hawthorne     Start of Care: 2022  Referral source: Elise Santana MD    : 1945  Medical/Treatment Diagnosis: Pain in right shoulder [M25.511]  Onset Date:2022  Prior Hospitalization: see medical history   Provider#: [id number]  Medications: Verified on Patient Summary List    Comorbidities: Diabetes, Htn, OA  Prior Level of Function:Independent in self care and household ADLs. Visits from Start of Care: 7    Missed Visits: 0    Reporting Period : 2022 to 10/27/2022    Goals/Measure of Progress:  Short Term Goals: To be accomplished in 4 weeks:                   Patient will report compliance with initial/basic HEP at least 1x/day to aid in rehabilitation program.                   Status at IE: Patient instructed in and provided written copy of initial Home Exercise Program.  Current  10/27/22: Patient reports of complaint with HEP every other day due to pain. Partially met                      Patient will display full right  shoulder pain free PROM iall planes to aid in completion of ADLs. Status at IE: flex 108, abd 84, ER 41, IR 36                  Current  10/27/22: Flex 110, abd 85, ER 41 IR 36. Partially met     Long Term Goals: To be accomplished in 8 weeks:                   Patient will report compliance with comprehensive HEP a least 3-4x/week to aid in rehabilitation/strengthening program.                   Status at IE: Patient instructed in and provided written copy of initial Home Exercise Program.                  Current  10/27/22: Patient reports of complaint with HEP every other day due to pain. Partially met                      Patient will display full right  shoulder pain free AROM iall planes to aid in completion of ADLs.                    Status at IE: flex 59, abd 66, ER 23, IR 26 Current  10/27/22[de-identified] AAROM flex 70, abd 66, IR 30. Partially met     Patient will report no pain greater than 1-2/10 with overhead activities to aid in completion of ADLs. Status at IE: 5                   Current  10/27/22: 5/10 pain at worst. No progression                       Patient will increase right shoulder strength to 5/5 throughout all planes to aid in completion of ADLs. Status at IE: flex 3-, abd 3-, ER 3-                   Current 10/13/22: right shoulder flexion, extension, and ER MMT 3-/5. No progression                      Patient will increase FOTO (an established functional score where 100 = no disability) score to 64 points overall to demonstrate improvement in functional status. Status at IE: 39                  Current  10/27/22: 40. No progression       Assessment/ Summary of Care: Jono Bravo is a 64 y.o.  female who presents with  MRI confirmed \"massive right rotator cuff tear\". Currently, patient has been complaint and continues to  improved gradually with right shoulder ROM. However, patient continues to make slow progression with MMT and pain. Patient has requested to be discharged from PT until after rotator cuff surgical intervention.        RECOMMENDATIONS:  [x]Discontinue therapy: []Patient has reached or is progressing toward set goals      []Patient is non-compliant or has abdicated      [x]Patient is compliant and pursing surgical intervention    Elise Saxena PTA 10/27/2022 11:52 AM

## 2022-10-27 NOTE — PROGRESS NOTES
PT DAILY TREATMENT NOTE    Patient Name: Dg Casanova  OOFH:  : 1945  [x]  Patient  Verified  Payor: [place] MEDICARE / Plan: [place] MEDICARE PART A & B / Product Type: Medicare /    In AOXA:2630  Out time:1244  Total Treatment Time (min): 54  Total Timed Codes (min): 44  1:1 Treatment Time (MC/BCBS only): 44   Visit #: 7 of 16    Treatment Dx: Pain in right shoulder [M25.511]    SUBJECTIVE  Pain Level (0-10 scale): 0  Any medication changes, allergies to medications, adverse drug reactions, diagnosis change, or new procedure performed?: [x] No    [] Yes (see summary sheet for update)  Subjective functional status/changes:   [] No changes reported  \" I woke up without any shoulder pain this morning. \"    OBJECTIVE  Modalities Rationale:     decrease edema, decrease inflammation, decrease pain, and decrease pain  to improve patient's ability to return PLOF                min [] Estim, type/location:                                                            []  att     []  unatt     []  w/US     []  w/ice    []  w/heat     min []  Mechanical Traction: type/lbs                    []  pro   []  sup   []  int   []  cont    []  before manual    []  after manual     min []  Ultrasound, settings/location:        min []  Iontophoresis w/ dexamethasone, location:                                                []  take home patch       []  in clinic     min []  Ice     []  Heat    location/position:     10 min [x]  Vasopneumatic Device, press/temp: Right shoulder/ low/34 degrees   If using vaso (only need to measure limb vaso being performed on)      pre-treatment girth : 44 cm      post-treatment girth : 43 cm      measured at (landmark location) :  Acromion     min []  Other:     [x] Skin assessment post-treatment (if applicable):    [x]  intact    []  redness- no adverse reaction                  []redness - adverse reaction:         20 min Therapeutic Exercise:  [x] See flow sheet :   Rationale: increase ROM, increase strength and decrease pain to improve the patients ability to complete ADLs     14 min Therapeutic Activity:  [x]  See flow sheet :   Rationale: increase ROM, increase strength and improve coordination  to improve the patients ability to complete ADLs     10 min Manual Therapy:  right shoulder PROM flexion and scapular   Rationale: decrease pain, increase ROM, increase tissue extensibility, and increase postural awareness to improve the patients ability to return PLOF  The manual therapy interventions were performed at a separate and distinct time from the therapeutic activities interventions. With   [] TE   [] TA   [] neuro   [] other: Patient Education: [x] Review HEP    [] Progressed/Changed HEP based on:   [] positioning   [] body mechanics   [] transfers   [x] heat/ice application    [] other:      Other Objective/Functional Measures: NA     Pain Level (0-10 scale) post treatment: 0    ASSESSMENT/Changes in Function:    Ivory Liriano is a 64 y.o.  female who presents with  MRI confirmed \"massive right rotator cuff tear\". Currently, patient has been complaint and continues to  improved gradually with right shoulder ROM. However, patient continues to make slow progression with MMT and pain. Patient has requested to be discharged from PT until after rotator cuff surgical intervention. []  See Plan of Care  []  See progress note/recertification  [x]  See Discharge Summary         Progress towards goals / Updated goals:  Short Term Goals: To be accomplished in 4 weeks:                   Patient will report compliance with initial/basic HEP at least 1x/day to aid in rehabilitation program.                   Status at IE: Patient instructed in and provided written copy of initial Home Exercise Program.  Current  10/27/22: Patient reports of complaint with HEP every other day due to pain.  Partially met                      Patient will display full right  shoulder pain free PROM iall planes to aid in completion of ADLs. Status at IE: flex 108, abd 84, ER 41, IR 36                  Current  10/27/22: Flex 110, abd 85, ER 41 IR 36. Partially met     Long Term Goals: To be accomplished in 8 weeks:                   Patient will report compliance with comprehensive HEP a least 3-4x/week to aid in rehabilitation/strengthening program.                   Status at IE: Patient instructed in and provided written copy of initial Home Exercise Program.                  Current  10/27/22: Patient reports of complaint with HEP every other day due to pain. Partially met                      Patient will display full right  shoulder pain free AROM iall planes to aid in completion of ADLs. Status at IE: flex 59, abd 66, ER 23, IR 26                   Current  10/27/22[de-identified] AAROM flex 70, abd 66, IR 30. Partially met     Patient will report no pain greater than 1-2/10 with overhead activities to aid in completion of ADLs. Status at IE: 5                   Current  10/27/22: 5/10 pain at worst. No progression                       Patient will increase right shoulder strength to 5/5 throughout all planes to aid in completion of ADLs. Status at IE: flex 3-, abd 3-, ER 3-                   Current 10/13/22: right shoulder flexion, extension, and ER MMT 3-/5. No progression                      Patient will increase FOTO (an established functional score where 100 = no disability) score to 64 points overall to demonstrate improvement in functional status. Status at IE: 39                  Current  10/27/22: 40.  No progression    PLAN  []  Upgrade activities as tolerated     [x]  Continue plan of care  []  Update interventions per flow sheet       [x]  Discharge due to: Surgical intervention  []  Other:_      Luca Espinoza PTA 10/27/2022  9:19 AM    Future Appointments   Date Time Provider Eder Bustillos   10/27/2022 11:45 AM Yuriy Salazar PTA [facility]

## 2022-10-27 NOTE — PROGRESS NOTES
Physical Therapy Discharge Instructions    [facility] at [facility], [address] [place] [address]  Phone: [phone]   Fax: [phone]      Patient: Daniella Chambers  : 1945    Continue Home Exercise Program 7 times per week      Continue with    [x] Ice  as needed      [] Heat           Follow up with MD:     [] Upon completion of therapy     [x] As needed      Recommendations:     [x]   Return to activity with home program    []   Return to activity with the following modifications:       []Post Rehab Program    []Join Independent aquatic program     []Return to/join local gym      Additional Comments: Please contact clinic at above phone number if you have any questions regarding Home Exercise Program or self care instructions.       Manual BENSON Burns 10/27/2022 11:51 AM

## 2022-12-05 ENCOUNTER — TRANSCRIBE ORDER (OUTPATIENT)
Dept: REGISTRATION | Age: 77
End: 2022-12-05

## 2022-12-05 ENCOUNTER — HOSPITAL ENCOUNTER (OUTPATIENT)
Dept: PREADMISSION TESTING | Age: 77
Discharge: HOME | End: 2022-12-05
Payer: MEDICARE

## 2022-12-05 DIAGNOSIS — M12.511 TRAUMATIC ARTHROPATHY OF RIGHT SHOULDER: Primary | ICD-10-CM

## 2022-12-05 DIAGNOSIS — M12.511 TRAUMATIC ARTHROPATHY OF RIGHT SHOULDER: ICD-10-CM

## 2022-12-05 LAB
ANION GAP SERPL CALC-SCNC: 6 MMOL/L (ref 3–18)
ATRIAL RATE: 94 BPM
BASOPHILS # BLD: 0.1 K/UL (ref 0–0.1)
BASOPHILS NFR BLD: 1 % (ref 0–2)
BUN SERPL-MCNC: 11 MG/DL (ref 7–18)
BUN/CREAT SERPL: 13 (ref 12–20)
CALCIUM SERPL-MCNC: 9.3 MG/DL (ref 8.5–10.1)
CALCULATED P AXIS, ECG09: 64 DEGREES
CALCULATED R AXIS, ECG10: 31 DEGREES
CALCULATED T AXIS, ECG11: 66 DEGREES
CHLORIDE SERPL-SCNC: 100 MMOL/L (ref 100–111)
CO2 SERPL-SCNC: 31 MMOL/L (ref 21–32)
CREAT SERPL-MCNC: 0.88 MG/DL (ref 0.6–1.3)
DIAGNOSIS, 93000: NORMAL
DIFFERENTIAL METHOD BLD: ABNORMAL
EOSINOPHIL # BLD: 0.1 K/UL (ref 0–0.4)
EOSINOPHIL NFR BLD: 1 % (ref 0–5)
ERYTHROCYTE [DISTWIDTH] IN BLOOD BY AUTOMATED COUNT: 12.8 % (ref 11.6–14.5)
EST. AVERAGE GLUCOSE BLD GHB EST-MCNC: 163 MG/DL
GLUCOSE SERPL-MCNC: 152 MG/DL (ref 74–99)
HBA1C MFR BLD: 7.3 % (ref 4.2–5.6)
HCT VFR BLD AUTO: 38.7 % (ref 35–45)
HGB BLD-MCNC: 12.7 G/DL (ref 12–16)
IMM GRANULOCYTES # BLD AUTO: 0 K/UL (ref 0–0.04)
IMM GRANULOCYTES NFR BLD AUTO: 0 % (ref 0–0.5)
INR PPP: 1 (ref 0.8–1.2)
LYMPHOCYTES # BLD: 2.9 K/UL (ref 0.9–3.6)
LYMPHOCYTES NFR BLD: 40 % (ref 21–52)
MCH RBC QN AUTO: 30.6 PG (ref 24–34)
MCHC RBC AUTO-ENTMCNC: 32.8 G/DL (ref 31–37)
MCV RBC AUTO: 93.3 FL (ref 78–100)
MONOCYTES # BLD: 0.8 K/UL (ref 0.05–1.2)
MONOCYTES NFR BLD: 10 % (ref 3–10)
NEUTS SEG # BLD: 3.5 K/UL (ref 1.8–8)
NEUTS SEG NFR BLD: 48 % (ref 40–73)
NRBC # BLD: 0 K/UL (ref 0–0.01)
NRBC BLD-RTO: 0 PER 100 WBC
P-R INTERVAL, ECG05: 216 MS
PLATELET # BLD AUTO: 231 K/UL (ref 135–420)
PMV BLD AUTO: 9.9 FL (ref 9.2–11.8)
POTASSIUM SERPL-SCNC: 3.8 MMOL/L (ref 3.5–5.5)
PROTHROMBIN TIME: 13.5 SEC (ref 11.5–15.2)
Q-T INTERVAL, ECG07: 352 MS
QRS DURATION, ECG06: 80 MS
QTC CALCULATION (BEZET), ECG08: 440 MS
RBC # BLD AUTO: 4.15 M/UL (ref 4.2–5.3)
SODIUM SERPL-SCNC: 137 MMOL/L (ref 136–145)
VENTRICULAR RATE, ECG03: 94 BPM
WBC # BLD AUTO: 7.4 K/UL (ref 4.6–13.2)

## 2022-12-05 PROCEDURE — 85025 COMPLETE CBC W/AUTO DIFF WBC: CPT

## 2022-12-05 PROCEDURE — 80048 BASIC METABOLIC PNL TOTAL CA: CPT

## 2022-12-05 PROCEDURE — 93005 ELECTROCARDIOGRAM TRACING: CPT

## 2022-12-05 PROCEDURE — 85610 PROTHROMBIN TIME: CPT

## 2022-12-05 PROCEDURE — 83036 HEMOGLOBIN GLYCOSYLATED A1C: CPT

## 2022-12-05 PROCEDURE — 36415 COLL VENOUS BLD VENIPUNCTURE: CPT

## 2022-12-20 ENCOUNTER — HOSPITAL ENCOUNTER (OUTPATIENT)
Dept: PREADMISSION TESTING | Age: 77
Discharge: HOME | End: 2022-12-20

## 2022-12-20 VITALS — BODY MASS INDEX: 28 KG/M2 | HEIGHT: 64 IN | WEIGHT: 164 LBS

## 2022-12-20 RX ORDER — GLIPIZIDE 10 MG/1
10 TABLET, FILM COATED, EXTENDED RELEASE ORAL 2 TIMES DAILY
COMMUNITY
Start: 2022-02-18 | End: 2023-02-18

## 2022-12-20 RX ORDER — OMEPRAZOLE 20 MG/1
20 CAPSULE, DELAYED RELEASE ORAL DAILY
COMMUNITY
Start: 2022-02-18 | End: 2023-02-18

## 2022-12-20 RX ORDER — DULAGLUTIDE 3 MG/.5ML
3 INJECTION, SOLUTION SUBCUTANEOUS
COMMUNITY
Start: 2022-11-01

## 2022-12-20 RX ORDER — LORATADINE 10 MG/1
10 TABLET ORAL
COMMUNITY
Start: 2022-02-18 | End: 2023-02-18

## 2022-12-20 RX ORDER — LOSARTAN POTASSIUM AND HYDROCHLOROTHIAZIDE 12.5; 5 MG/1; MG/1
1 TABLET ORAL DAILY
COMMUNITY
Start: 2022-02-18 | End: 2023-02-18

## 2022-12-20 RX ORDER — METOPROLOL TARTRATE 50 MG/1
50 TABLET ORAL 2 TIMES DAILY
COMMUNITY
Start: 2022-02-18 | End: 2023-02-18

## 2022-12-20 RX ORDER — HYDROXYZINE 25 MG/1
25 TABLET, FILM COATED ORAL AS NEEDED
COMMUNITY
Start: 2021-12-18 | End: 2023-02-18

## 2022-12-20 RX ORDER — CEFAZOLIN SODIUM/WATER 2 G/20 ML
2 SYRINGE (ML) INTRAVENOUS
Status: CANCELLED | OUTPATIENT
Start: 2022-12-28 | End: 2022-12-29

## 2022-12-20 RX ORDER — REPAGLINIDE 2 MG/1
2 TABLET ORAL
COMMUNITY
Start: 2021-12-18 | End: 2023-02-18

## 2022-12-20 RX ORDER — METFORMIN HYDROCHLORIDE 1000 MG/1
1000 TABLET ORAL 2 TIMES DAILY
COMMUNITY
Start: 2022-02-18 | End: 2023-02-18

## 2022-12-20 RX ORDER — SODIUM CHLORIDE, SODIUM LACTATE, POTASSIUM CHLORIDE, CALCIUM CHLORIDE 600; 310; 30; 20 MG/100ML; MG/100ML; MG/100ML; MG/100ML
125 INJECTION, SOLUTION INTRAVENOUS CONTINUOUS
Status: CANCELLED | OUTPATIENT
Start: 2022-12-28

## 2022-12-20 RX ORDER — IBUPROFEN 400 MG/1
400 TABLET ORAL
COMMUNITY
Start: 2022-08-02 | End: 2022-12-20

## 2022-12-20 RX ORDER — CHOLECALCIFEROL (VITAMIN D3) 125 MCG
2000 CAPSULE ORAL
COMMUNITY

## 2022-12-20 RX ORDER — ATORVASTATIN CALCIUM 40 MG/1
40 TABLET, FILM COATED ORAL
COMMUNITY
Start: 2022-02-22 | End: 2022-12-20

## 2022-12-20 RX ORDER — PRAVASTATIN SODIUM 20 MG/1
1 TABLET ORAL
COMMUNITY
Start: 2022-09-12 | End: 2023-09-12

## 2022-12-20 NOTE — PERIOP NOTES
PAT - SURGICAL PRE-ADMISSION INSTRUCTIONS    NAME:  Myesha Reich                                                          TODAY'S DATE:  12/20/2022    SURGERY DATE:  12/28/2022                                  SURGERY ARRIVAL TIME:   TBD    Do NOT eat or drink anything, including candy or gum, after MIDNIGHT on 12/28/2022 , unless you have specific instructions from your Surgeon or Anesthesia Provider to do so. No smoking 24 hours before surgery. No alcohol 24 hours prior to the day of surgery. No recreational drugs for one week prior to the day of surgery. Leave all valuables, including money/purse, at home. Remove all jewelry, nail polish, makeup (including mascara); no lotions, powders, deodorant, or perfume/cologne/after shave. Glasses/Contact lenses and Dentures may be worn to the hospital.  They will be removed prior to surgery. Call your doctor if symptoms of a cold or illness develop within 24 ours prior to surgery. AN ADULT MUST DRIVE YOU HOME AFTER OUTPATIENT SURGERY. If you are having an OUTPATIENT procedure, please make arrangements for a responsible adult to be with you for 24 hours after your surgery. If you are admitted to the hospital, you will be assigned to a bed after surgery is complete. Normally a family member will not be able to see you until you are in your assigned bed. 12. Visitation Restrictions Explained. Pt denies an advanced directive, pt denies DNR  Pt is vaccinated. Covid vaccination record under immunization tab. Informed pt can only have one person with her on dos. Pt states , son, sister wants to be there for support. Special Instructions:  Omit metformin pm dose on 12/27 and am dose on 12/28, do not take glipizide and prandin on dos. Take omeprazole and metoprolol on dos with sips of water  Pt instructed to avoid NSAIDs 7 days prior to procedure per MDA. Pt instructed to hold vitamins/supplements 2 weeks prior to procedure per MDA.    Patient Prep:  use CHG solution, pt received, instructions reviewed. These surgical instructions were reviewed with patient during the PAT phone interview.

## 2022-12-27 ENCOUNTER — ANESTHESIA EVENT (OUTPATIENT)
Dept: SURGERY | Age: 77
DRG: 483 | End: 2022-12-27
Payer: MEDICARE

## 2022-12-28 ENCOUNTER — HOSPITAL ENCOUNTER (INPATIENT)
Age: 77
LOS: 1 days | Discharge: HOME | DRG: 483 | End: 2022-12-29
Attending: STUDENT IN AN ORGANIZED HEALTH CARE EDUCATION/TRAINING PROGRAM | Admitting: STUDENT IN AN ORGANIZED HEALTH CARE EDUCATION/TRAINING PROGRAM
Payer: MEDICARE

## 2022-12-28 ENCOUNTER — ANESTHESIA (OUTPATIENT)
Dept: SURGERY | Age: 77
DRG: 483 | End: 2022-12-28
Payer: MEDICARE

## 2022-12-28 ENCOUNTER — APPOINTMENT (OUTPATIENT)
Dept: GENERAL RADIOLOGY | Age: 77
DRG: 483 | End: 2022-12-28
Attending: STUDENT IN AN ORGANIZED HEALTH CARE EDUCATION/TRAINING PROGRAM
Payer: MEDICARE

## 2022-12-28 PROBLEM — G89.18 ACUTE POST-OPERATIVE PAIN: Status: ACTIVE | Noted: 2022-12-28

## 2022-12-28 PROBLEM — M19.011 GLENOHUMERAL ARTHRITIS, RIGHT: Status: ACTIVE | Noted: 2022-12-28

## 2022-12-28 LAB
ABO + RH BLD: NORMAL
BLOOD GROUP ANTIBODIES SERPL: NORMAL
GLUCOSE BLD STRIP.AUTO-MCNC: 143 MG/DL (ref 70–110)
GLUCOSE BLD STRIP.AUTO-MCNC: 91 MG/DL (ref 70–110)
SPECIMEN EXP DATE BLD: NORMAL

## 2022-12-28 PROCEDURE — 0RRJ00Z REPLACEMENT OF RIGHT SHOULDER JOINT WITH REVERSE BALL AND SOCKET SYNTHETIC SUBSTITUTE, OPEN APPROACH: ICD-10-PCS | Performed by: STUDENT IN AN ORGANIZED HEALTH CARE EDUCATION/TRAINING PROGRAM

## 2022-12-28 PROCEDURE — 86900 BLOOD TYPING SEROLOGIC ABO: CPT

## 2022-12-28 PROCEDURE — 74011250636 HC RX REV CODE- 250/636: Performed by: NURSE ANESTHETIST, CERTIFIED REGISTERED

## 2022-12-28 PROCEDURE — 77030002933 HC SUT MCRYL J&J -A: Performed by: STUDENT IN AN ORGANIZED HEALTH CARE EDUCATION/TRAINING PROGRAM

## 2022-12-28 PROCEDURE — 74011000250 HC RX REV CODE- 250: Performed by: NURSE ANESTHETIST, CERTIFIED REGISTERED

## 2022-12-28 PROCEDURE — 73020 X-RAY EXAM OF SHOULDER: CPT

## 2022-12-28 PROCEDURE — 74011250636 HC RX REV CODE- 250/636: Performed by: STUDENT IN AN ORGANIZED HEALTH CARE EDUCATION/TRAINING PROGRAM

## 2022-12-28 PROCEDURE — 74011250637 HC RX REV CODE- 250/637: Performed by: ANESTHESIOLOGY

## 2022-12-28 PROCEDURE — 74011000250 HC RX REV CODE- 250: Performed by: STUDENT IN AN ORGANIZED HEALTH CARE EDUCATION/TRAINING PROGRAM

## 2022-12-28 PROCEDURE — 77030031139 HC SUT VCRL2 J&J -A: Performed by: STUDENT IN AN ORGANIZED HEALTH CARE EDUCATION/TRAINING PROGRAM

## 2022-12-28 PROCEDURE — 77030020782 HC GWN BAIR PAWS FLX 3M -B: Performed by: STUDENT IN AN ORGANIZED HEALTH CARE EDUCATION/TRAINING PROGRAM

## 2022-12-28 PROCEDURE — 77030008683 HC TU ET CUF COVD -A: Performed by: ANESTHESIOLOGY

## 2022-12-28 PROCEDURE — 74011250636 HC RX REV CODE- 250/636: Performed by: ANESTHESIOLOGY

## 2022-12-28 PROCEDURE — 77030008477 HC STYL SATN SLP COVD -A: Performed by: ANESTHESIOLOGY

## 2022-12-28 PROCEDURE — 82962 GLUCOSE BLOOD TEST: CPT

## 2022-12-28 PROCEDURE — 77030040361 HC SLV COMPR DVT MDII -B: Performed by: STUDENT IN AN ORGANIZED HEALTH CARE EDUCATION/TRAINING PROGRAM

## 2022-12-28 PROCEDURE — 74011250637 HC RX REV CODE- 250/637: Performed by: STUDENT IN AN ORGANIZED HEALTH CARE EDUCATION/TRAINING PROGRAM

## 2022-12-28 PROCEDURE — 77030020268 HC MISC GENERAL SUPPLY: Performed by: STUDENT IN AN ORGANIZED HEALTH CARE EDUCATION/TRAINING PROGRAM

## 2022-12-28 PROCEDURE — 76060000036 HC ANESTHESIA 2.5 TO 3 HR: Performed by: STUDENT IN AN ORGANIZED HEALTH CARE EDUCATION/TRAINING PROGRAM

## 2022-12-28 PROCEDURE — 77030027138 HC INCENT SPIROMETER -A: Performed by: STUDENT IN AN ORGANIZED HEALTH CARE EDUCATION/TRAINING PROGRAM

## 2022-12-28 PROCEDURE — 77030006643: Performed by: ANESTHESIOLOGY

## 2022-12-28 PROCEDURE — 77030006835 HC BLD SAW SAG STRY -B: Performed by: STUDENT IN AN ORGANIZED HEALTH CARE EDUCATION/TRAINING PROGRAM

## 2022-12-28 PROCEDURE — 76010000132 HC OR TIME 2.5 TO 3 HR: Performed by: STUDENT IN AN ORGANIZED HEALTH CARE EDUCATION/TRAINING PROGRAM

## 2022-12-28 PROCEDURE — 64450 NJX AA&/STRD OTHER PN/BRANCH: CPT | Performed by: STUDENT IN AN ORGANIZED HEALTH CARE EDUCATION/TRAINING PROGRAM

## 2022-12-28 PROCEDURE — 76942 ECHO GUIDE FOR BIOPSY: CPT | Performed by: STUDENT IN AN ORGANIZED HEALTH CARE EDUCATION/TRAINING PROGRAM

## 2022-12-28 PROCEDURE — 76210000006 HC OR PH I REC 0.5 TO 1 HR: Performed by: STUDENT IN AN ORGANIZED HEALTH CARE EDUCATION/TRAINING PROGRAM

## 2022-12-28 PROCEDURE — L3670 SO ACRO/CLAV CAN WEB PRE OTS: HCPCS | Performed by: STUDENT IN AN ORGANIZED HEALTH CARE EDUCATION/TRAINING PROGRAM

## 2022-12-28 PROCEDURE — 65270000029 HC RM PRIVATE

## 2022-12-28 PROCEDURE — C1776 JOINT DEVICE (IMPLANTABLE): HCPCS | Performed by: STUDENT IN AN ORGANIZED HEALTH CARE EDUCATION/TRAINING PROGRAM

## 2022-12-28 PROCEDURE — 2709999900 HC NON-CHARGEABLE SUPPLY: Performed by: STUDENT IN AN ORGANIZED HEALTH CARE EDUCATION/TRAINING PROGRAM

## 2022-12-28 PROCEDURE — 74011000250 HC RX REV CODE- 250: Performed by: ANESTHESIOLOGY

## 2022-12-28 DEVICE — ALTIVATE REVERSE, HUMERAL STEM, SMALL SHELL, SZ 8X48MM
Type: IMPLANTABLE DEVICE | Site: SHOULDER | Status: FUNCTIONAL
Brand: DJO SURGICAL

## 2022-12-28 DEVICE — AR, SMALL SOCKET INSERT, 32MM NEUTRAL EPLUS
Type: IMPLANTABLE DEVICE | Site: SHOULDER | Status: FUNCTIONAL
Brand: DJO SURGICAL

## 2022-12-28 DEVICE — RSP BASEPLATE, 30MM, W/P2 COATING
Type: IMPLANTABLE DEVICE | Site: SHOULDER | Status: FUNCTIONAL
Brand: DJO SURGICAL

## 2022-12-28 DEVICE — SCREW, LOCKING BONE, RSP, 5X26
Type: IMPLANTABLE DEVICE | Site: SHOULDER | Status: FUNCTIONAL
Brand: DJO SURGICAL

## 2022-12-28 DEVICE — SCREW, LOCKING BONE, RSP, 5X14
Type: IMPLANTABLE DEVICE | Site: SHOULDER | Status: FUNCTIONAL
Brand: DJO SURGICAL

## 2022-12-28 DEVICE — SCREW, LOCKING BONE, RSP, 5X22
Type: IMPLANTABLE DEVICE | Site: SHOULDER | Status: FUNCTIONAL
Brand: DJO SURGICAL

## 2022-12-28 DEVICE — GLENOID, HEAD W/RETAINING SCREW, RSP, 32MM/-4MM
Type: IMPLANTABLE DEVICE | Site: SHOULDER | Status: FUNCTIONAL
Brand: DJO SURGICAL

## 2022-12-28 RX ORDER — ALBUTEROL SULFATE 0.83 MG/ML
2.5 SOLUTION RESPIRATORY (INHALATION) AS NEEDED
Status: DISCONTINUED | OUTPATIENT
Start: 2022-12-28 | End: 2022-12-29 | Stop reason: HOSPADM

## 2022-12-28 RX ORDER — FLUMAZENIL 0.1 MG/ML
0.2 INJECTION INTRAVENOUS
Status: DISCONTINUED | OUTPATIENT
Start: 2022-12-28 | End: 2022-12-29 | Stop reason: HOSPADM

## 2022-12-28 RX ORDER — LIDOCAINE HYDROCHLORIDE 20 MG/ML
INJECTION, SOLUTION EPIDURAL; INFILTRATION; INTRACAUDAL; PERINEURAL AS NEEDED
Status: DISCONTINUED | OUTPATIENT
Start: 2022-12-28 | End: 2022-12-28 | Stop reason: HOSPADM

## 2022-12-28 RX ORDER — DIPHENHYDRAMINE HYDROCHLORIDE 50 MG/ML
12.5 INJECTION, SOLUTION INTRAMUSCULAR; INTRAVENOUS
Status: DISCONTINUED | OUTPATIENT
Start: 2022-12-28 | End: 2022-12-29 | Stop reason: HOSPADM

## 2022-12-28 RX ORDER — PANTOPRAZOLE SODIUM 40 MG/1
40 TABLET, DELAYED RELEASE ORAL
Status: DISCONTINUED | OUTPATIENT
Start: 2022-12-29 | End: 2022-12-29 | Stop reason: HOSPADM

## 2022-12-28 RX ORDER — ROCURONIUM BROMIDE 10 MG/ML
INJECTION, SOLUTION INTRAVENOUS AS NEEDED
Status: DISCONTINUED | OUTPATIENT
Start: 2022-12-28 | End: 2022-12-28 | Stop reason: HOSPADM

## 2022-12-28 RX ORDER — GLIPIZIDE 2.5 MG/1
10 TABLET, EXTENDED RELEASE ORAL 2 TIMES DAILY
Status: DISCONTINUED | OUTPATIENT
Start: 2022-12-29 | End: 2022-12-29 | Stop reason: HOSPADM

## 2022-12-28 RX ORDER — FENTANYL CITRATE 50 UG/ML
INJECTION, SOLUTION INTRAMUSCULAR; INTRAVENOUS AS NEEDED
Status: DISCONTINUED | OUTPATIENT
Start: 2022-12-28 | End: 2022-12-28 | Stop reason: HOSPADM

## 2022-12-28 RX ORDER — INSULIN LISPRO 100 [IU]/ML
INJECTION, SOLUTION INTRAVENOUS; SUBCUTANEOUS ONCE
Status: ACTIVE | OUTPATIENT
Start: 2022-12-28 | End: 2022-12-29

## 2022-12-28 RX ORDER — PHENYLEPHRINE HCL IN 0.9% NACL 1 MG/10 ML
SYRINGE (ML) INTRAVENOUS AS NEEDED
Status: DISCONTINUED | OUTPATIENT
Start: 2022-12-28 | End: 2022-12-28 | Stop reason: HOSPADM

## 2022-12-28 RX ORDER — NALOXONE HYDROCHLORIDE 0.4 MG/ML
0.2 INJECTION, SOLUTION INTRAMUSCULAR; INTRAVENOUS; SUBCUTANEOUS AS NEEDED
Status: DISCONTINUED | OUTPATIENT
Start: 2022-12-28 | End: 2022-12-29 | Stop reason: HOSPADM

## 2022-12-28 RX ORDER — METFORMIN HYDROCHLORIDE 500 MG/1
1000 TABLET ORAL 2 TIMES DAILY
Status: DISCONTINUED | OUTPATIENT
Start: 2022-12-29 | End: 2022-12-29 | Stop reason: HOSPADM

## 2022-12-28 RX ORDER — OXYCODONE AND ACETAMINOPHEN 5; 325 MG/1; MG/1
1 TABLET ORAL
Status: DISCONTINUED | OUTPATIENT
Start: 2022-12-28 | End: 2022-12-29 | Stop reason: HOSPADM

## 2022-12-28 RX ORDER — AMOXICILLIN 250 MG
1 CAPSULE ORAL 2 TIMES DAILY
Status: DISCONTINUED | OUTPATIENT
Start: 2022-12-28 | End: 2022-12-29 | Stop reason: HOSPADM

## 2022-12-28 RX ORDER — REPAGLINIDE 0.5 MG/1
2 TABLET ORAL
Status: DISCONTINUED | OUTPATIENT
Start: 2022-12-29 | End: 2022-12-29 | Stop reason: HOSPADM

## 2022-12-28 RX ORDER — ONDANSETRON 2 MG/ML
4 INJECTION INTRAMUSCULAR; INTRAVENOUS
Status: DISCONTINUED | OUTPATIENT
Start: 2022-12-28 | End: 2022-12-29 | Stop reason: HOSPADM

## 2022-12-28 RX ORDER — TRANEXAMIC ACID 10 MG/ML
INJECTION, SOLUTION INTRAVENOUS AS NEEDED
Status: DISCONTINUED | OUTPATIENT
Start: 2022-12-28 | End: 2022-12-28 | Stop reason: HOSPADM

## 2022-12-28 RX ORDER — SODIUM CHLORIDE 0.9 % (FLUSH) 0.9 %
5-40 SYRINGE (ML) INJECTION AS NEEDED
Status: DISCONTINUED | OUTPATIENT
Start: 2022-12-28 | End: 2022-12-29 | Stop reason: HOSPADM

## 2022-12-28 RX ORDER — NAPROXEN 250 MG/1
500 TABLET ORAL 2 TIMES DAILY WITH MEALS
Status: DISCONTINUED | OUTPATIENT
Start: 2022-12-28 | End: 2022-12-29 | Stop reason: HOSPADM

## 2022-12-28 RX ORDER — INSULIN LISPRO 100 [IU]/ML
INJECTION, SOLUTION INTRAVENOUS; SUBCUTANEOUS ONCE
Status: DISCONTINUED | OUTPATIENT
Start: 2022-12-28 | End: 2022-12-28 | Stop reason: HOSPADM

## 2022-12-28 RX ORDER — FACIAL-BODY WIPES
10 EACH TOPICAL DAILY PRN
Status: DISCONTINUED | OUTPATIENT
Start: 2022-12-28 | End: 2022-12-29 | Stop reason: HOSPADM

## 2022-12-28 RX ORDER — METOPROLOL TARTRATE 50 MG/1
50 TABLET ORAL 2 TIMES DAILY
Status: DISCONTINUED | OUTPATIENT
Start: 2022-12-29 | End: 2022-12-29 | Stop reason: HOSPADM

## 2022-12-28 RX ORDER — ROPIVACAINE HYDROCHLORIDE 5 MG/ML
INJECTION, SOLUTION EPIDURAL; INFILTRATION; PERINEURAL
Status: COMPLETED | OUTPATIENT
Start: 2022-12-28 | End: 2022-12-28

## 2022-12-28 RX ORDER — ASPIRIN 81 MG/1
81 TABLET ORAL DAILY
Status: DISCONTINUED | OUTPATIENT
Start: 2022-12-29 | End: 2022-12-29 | Stop reason: HOSPADM

## 2022-12-28 RX ORDER — OXYCODONE AND ACETAMINOPHEN 10; 325 MG/1; MG/1
1 TABLET ORAL
Status: DISCONTINUED | OUTPATIENT
Start: 2022-12-28 | End: 2022-12-29 | Stop reason: HOSPADM

## 2022-12-28 RX ORDER — CEFAZOLIN SODIUM/WATER 2 G/20 ML
2 SYRINGE (ML) INTRAVENOUS EVERY 8 HOURS
Status: COMPLETED | OUTPATIENT
Start: 2022-12-28 | End: 2022-12-29

## 2022-12-28 RX ORDER — FENTANYL CITRATE 50 UG/ML
25 INJECTION, SOLUTION INTRAMUSCULAR; INTRAVENOUS AS NEEDED
Status: DISCONTINUED | OUTPATIENT
Start: 2022-12-28 | End: 2022-12-29 | Stop reason: HOSPADM

## 2022-12-28 RX ORDER — SODIUM CHLORIDE, SODIUM LACTATE, POTASSIUM CHLORIDE, CALCIUM CHLORIDE 600; 310; 30; 20 MG/100ML; MG/100ML; MG/100ML; MG/100ML
1000 INJECTION, SOLUTION INTRAVENOUS CONTINUOUS
Status: DISCONTINUED | OUTPATIENT
Start: 2022-12-28 | End: 2022-12-29 | Stop reason: HOSPADM

## 2022-12-28 RX ORDER — ONDANSETRON 2 MG/ML
INJECTION INTRAMUSCULAR; INTRAVENOUS AS NEEDED
Status: DISCONTINUED | OUTPATIENT
Start: 2022-12-28 | End: 2022-12-28 | Stop reason: HOSPADM

## 2022-12-28 RX ORDER — DEXMEDETOMIDINE HYDROCHLORIDE 100 UG/ML
INJECTION, SOLUTION INTRAVENOUS
Status: COMPLETED | OUTPATIENT
Start: 2022-12-28 | End: 2022-12-28

## 2022-12-28 RX ORDER — CEFAZOLIN SODIUM/WATER 2 G/20 ML
2 SYRINGE (ML) INTRAVENOUS
Status: COMPLETED | OUTPATIENT
Start: 2022-12-28 | End: 2022-12-28

## 2022-12-28 RX ORDER — SCOLOPAMINE TRANSDERMAL SYSTEM 1 MG/1
1 PATCH, EXTENDED RELEASE TRANSDERMAL
Status: DISCONTINUED | OUTPATIENT
Start: 2022-12-28 | End: 2022-12-28 | Stop reason: HOSPADM

## 2022-12-28 RX ORDER — DEXTROSE MONOHYDRATE 100 MG/ML
0-250 INJECTION, SOLUTION INTRAVENOUS AS NEEDED
Status: DISCONTINUED | OUTPATIENT
Start: 2022-12-28 | End: 2022-12-28 | Stop reason: HOSPADM

## 2022-12-28 RX ORDER — SODIUM CHLORIDE, SODIUM LACTATE, POTASSIUM CHLORIDE, CALCIUM CHLORIDE 600; 310; 30; 20 MG/100ML; MG/100ML; MG/100ML; MG/100ML
125 INJECTION, SOLUTION INTRAVENOUS CONTINUOUS
Status: DISCONTINUED | OUTPATIENT
Start: 2022-12-28 | End: 2022-12-29 | Stop reason: HOSPADM

## 2022-12-28 RX ORDER — MIDAZOLAM HYDROCHLORIDE 1 MG/ML
INJECTION, SOLUTION INTRAMUSCULAR; INTRAVENOUS AS NEEDED
Status: DISCONTINUED | OUTPATIENT
Start: 2022-12-28 | End: 2022-12-28 | Stop reason: HOSPADM

## 2022-12-28 RX ORDER — OXYCODONE AND ACETAMINOPHEN 5; 325 MG/1; MG/1
1 TABLET ORAL AS NEEDED
Status: DISCONTINUED | OUTPATIENT
Start: 2022-12-28 | End: 2022-12-29 | Stop reason: HOSPADM

## 2022-12-28 RX ORDER — HYDROMORPHONE HYDROCHLORIDE 1 MG/ML
1 INJECTION, SOLUTION INTRAMUSCULAR; INTRAVENOUS; SUBCUTANEOUS
Status: DISCONTINUED | OUTPATIENT
Start: 2022-12-28 | End: 2022-12-29 | Stop reason: HOSPADM

## 2022-12-28 RX ORDER — MAGNESIUM SULFATE 100 %
4 CRYSTALS MISCELLANEOUS AS NEEDED
Status: DISCONTINUED | OUTPATIENT
Start: 2022-12-28 | End: 2022-12-28 | Stop reason: HOSPADM

## 2022-12-28 RX ORDER — SODIUM CHLORIDE 0.9 % (FLUSH) 0.9 %
5-40 SYRINGE (ML) INJECTION EVERY 8 HOURS
Status: DISCONTINUED | OUTPATIENT
Start: 2022-12-28 | End: 2022-12-29 | Stop reason: HOSPADM

## 2022-12-28 RX ORDER — HYDROMORPHONE HYDROCHLORIDE 1 MG/ML
0.2 INJECTION, SOLUTION INTRAMUSCULAR; INTRAVENOUS; SUBCUTANEOUS
Status: DISCONTINUED | OUTPATIENT
Start: 2022-12-28 | End: 2022-12-29 | Stop reason: HOSPADM

## 2022-12-28 RX ORDER — PROPOFOL 10 MG/ML
INJECTION, EMULSION INTRAVENOUS AS NEEDED
Status: DISCONTINUED | OUTPATIENT
Start: 2022-12-28 | End: 2022-12-28 | Stop reason: HOSPADM

## 2022-12-28 RX ADMIN — ONDANSETRON HYDROCHLORIDE 4 MG: 2 INJECTION INTRAMUSCULAR; INTRAVENOUS at 16:49

## 2022-12-28 RX ADMIN — SODIUM CHLORIDE, SODIUM LACTATE, POTASSIUM CHLORIDE, AND CALCIUM CHLORIDE: 600; 310; 30; 20 INJECTION, SOLUTION INTRAVENOUS at 16:08

## 2022-12-28 RX ADMIN — LIDOCAINE HYDROCHLORIDE 60 MG: 20 INJECTION, SOLUTION EPIDURAL; INFILTRATION; INTRACAUDAL; PERINEURAL at 14:44

## 2022-12-28 RX ADMIN — Medication 2 G: at 14:56

## 2022-12-28 RX ADMIN — Medication 200 MCG: at 14:51

## 2022-12-28 RX ADMIN — MIDAZOLAM 2 MG: 1 INJECTION INTRAMUSCULAR; INTRAVENOUS at 14:24

## 2022-12-28 RX ADMIN — PROPOFOL 120 MG: 10 INJECTION, EMULSION INTRAVENOUS at 14:44

## 2022-12-28 RX ADMIN — ROPIVACAINE HYDROCHLORIDE 20 ML: 5 INJECTION, SOLUTION EPIDURAL; INFILTRATION; PERINEURAL at 14:26

## 2022-12-28 RX ADMIN — Medication 100 MCG: at 15:33

## 2022-12-28 RX ADMIN — CEFAZOLIN 2 G: 10 INJECTION, POWDER, FOR SOLUTION INTRAVENOUS at 22:01

## 2022-12-28 RX ADMIN — TRANEXAMIC ACID 1 G: 10 INJECTION, SOLUTION INTRAVENOUS at 16:51

## 2022-12-28 RX ADMIN — TRANEXAMIC ACID 1 G: 10 INJECTION, SOLUTION INTRAVENOUS at 14:57

## 2022-12-28 RX ADMIN — Medication 50 MCG: at 15:14

## 2022-12-28 RX ADMIN — DEXMEDETOMIDINE HYDROCHLORIDE 20 MCG: 100 INJECTION, SOLUTION INTRAVENOUS at 14:26

## 2022-12-28 RX ADMIN — Medication 50 MCG: at 15:02

## 2022-12-28 RX ADMIN — DOCUSATE SODIUM 50 MG AND SENNOSIDES 8.6 MG 1 TABLET: 8.6; 5 TABLET, FILM COATED ORAL at 22:00

## 2022-12-28 RX ADMIN — FENTANYL CITRATE 50 MCG: 50 INJECTION, SOLUTION INTRAMUSCULAR; INTRAVENOUS at 14:44

## 2022-12-28 RX ADMIN — SODIUM CHLORIDE, SODIUM LACTATE, POTASSIUM CHLORIDE, AND CALCIUM CHLORIDE 125 ML/HR: 600; 310; 30; 20 INJECTION, SOLUTION INTRAVENOUS at 18:51

## 2022-12-28 RX ADMIN — NAPROXEN 500 MG: 250 TABLET ORAL at 19:31

## 2022-12-28 RX ADMIN — Medication 100 MCG: at 14:53

## 2022-12-28 RX ADMIN — SODIUM CHLORIDE, SODIUM LACTATE, POTASSIUM CHLORIDE, AND CALCIUM CHLORIDE 125 ML/HR: 600; 310; 30; 20 INJECTION, SOLUTION INTRAVENOUS at 11:59

## 2022-12-28 RX ADMIN — SODIUM CHLORIDE, PRESERVATIVE FREE 10 ML: 5 INJECTION INTRAVENOUS at 22:00

## 2022-12-28 RX ADMIN — ROCURONIUM BROMIDE 40 MG: 10 INJECTION, SOLUTION INTRAVENOUS at 14:44

## 2022-12-28 NOTE — PERIOP NOTES
[address] [place] [address] made aware that SBAR is ready for review. Patient assigned room #318.  [facility], RN will be the nurse

## 2022-12-28 NOTE — PROGRESS NOTES
Ortho Sports Progress Note     ID: 69yo F with right rotator cuff arthropathy now s/p right reverse total shoulder arthroplasty on 12//28/22. S: no acute events post surgery. Pain well controlled. Visit Vitals  BP (!) 142/71   Pulse 80   Temp 97.8 °F (36.6 °C)   Resp 19   Ht 5' 4\" (1.626 m)   Wt 160 lb 1.6 oz (72.6 kg)   SpO2 100%   BMI 27.48 kg/m²       Gen: NAD  Cardiac: RRR Via RP  Respiratory: Unlabored on room air     Right upper extremity:  -Dressings in place that is clean, dry, intact. No surrounding erythema or induration. Mild ecchymosis inferior to incision site.  -able to perform TU, OK, X2-3   -Sensation  intact to VIF, VSF, lateral shoulder, Sensation intact but decreased to FDWS  2+RP, digits WWP     Imaging: Postop single AP view-hardware in good position without interval failure     A/p: 69yo F with right rotator cuff arthropathy now s/p right reverse total shoulder arthroplasty on 12//28/22.     -ABX: periop abx x 24hrs  -HDS, will CTM  -Diet: Advance to regular diet as tolerated  -NWB RUE, Continue to wear sling  -PT/OT: to work with pt in the AM  -Pain well controlled  -Restart home meds POD1  -Anticipate discharge POD1  -Pt will follow up with me in ~2wks as scheduled

## 2022-12-28 NOTE — PERIOP NOTES
TRANSFER - OUT REPORT:    Verbal report given to Cr Bailey RN(name) on [facility]  being transferred to [address] [place] [address](unit) for routine post - op       Report consisted of patients Situation, Background, Assessment and   Recommendations(SBAR). Information from the following report(s) SBAR, Procedure Summary, Intake/Output, MAR, and Recent Results was reviewed with the receiving nurse. Lines:   Peripheral IV 12/28/22 Left;Posterior Hand (Active)   Site Assessment Clean, dry, & intact 12/28/22 1741   Phlebitis Assessment 0 12/28/22 1741   Infiltration Assessment 0 12/28/22 1741   Dressing Status Clean, dry, & intact 12/28/22 1741   Dressing Type Tape;Transparent 12/28/22 1741   Hub Color/Line Status Infusing;Patent;Pink 12/28/22 1741   Alcohol Cap Used No 12/28/22 1158        Opportunity for questions and clarification was provided.       Patient transported with:   O2 @ 2 liters  Registered Nurse

## 2022-12-28 NOTE — ANESTHESIA PROCEDURE NOTES
Peripheral Block    Start time: 12/28/2022 2:24 PM  End time: 12/28/2022 2:27 PM  Performed by: Nas Ramos MD  Authorized by: Nas Ramos MD       Pre-procedure: Indications: at surgeon's request, post-op pain management and procedure for pain    Preanesthetic Checklist: patient identified, risks and benefits discussed, site marked, timeout performed, anesthesia consent given, patient being monitored and fire risk safety assessment completed and verbalized    Timeout Time: 14:24 EST      Block Type:   Block Type: Interscalene  Laterality:  Right  Monitoring:  Standard ASA monitoring, continuous pulse ox, frequent vital sign checks, heart rate, responsive to questions and oxygen  Injection Technique:  Single shot  Procedures: ultrasound guided    Patient Position: seated  Prep: chlorhexidine    Location:  Interscalene  Needle Type:  Stimuplex  Needle Gauge:  22 G  Needle Localization:  Anatomical landmarks and ultrasound guidance  Motor Response comment:   Motor Response: minimal motor response >0.4 mA    Medication Injected:  Ropivacaine (PF) (NAROPIN) 5 mg/mL (0.5 %) injection - Peripheral Nerve Block   20 mL - 12/28/2022 2:26:00 PM  dexmedeTOMidine (PRECEDEX) 100 mcg/mL iv solution - Peripheral Nerve Block   20 mcg - 12/28/2022 2:26:00 PM  Med Admin Time: 12/28/2022 2:26 PM    Assessment:  Number of attempts:  1  Injection Assessment:  Incremental injection every 5 mL, local visualized surrounding nerve on ultrasound, negative aspiration for CSF, negative aspiration for blood, no paresthesia, no intravascular symptoms and ultrasound image on chart  Patient tolerance:  Patient tolerated the procedure well with no immediate complications                      Block: Interscalene    : GLORIA Gill MD    Indication: Post-op analgesia per surgeon's request    Target nerves identified by ultrasound, image placed on chart.        Ultrasound used to verity needle placement and confirm local anesthetic spread around nerves

## 2022-12-28 NOTE — INTERVAL H&P NOTE
Update History & Physical    The Patient's History and Physical of December 28, 2022 was reviewed with the patient and I examined the patient. There was no change. The surgical site was confirmed by the patient and me. Plan:  The risk, benefits, expected outcome, and alternative to the recommended procedure have been discussed with the patient. Patient understands and wants to proceed with the procedure.     Electronically signed by Cole Hendrix MD on 12/28/2022 at 2:04 PM

## 2022-12-28 NOTE — OP NOTES
Operative Report    Patient: Tory Teague MRN: [id number]  SSN: [id number]    YOB: 1945  Age: 76 y.o. Sex: female       Date of Surgery: 12/28/2022     Preoperative Diagnosis: RIGHT SHOULDER ARTHRITIS     Postoperative Diagnosis: RIGHT SHOULDER ARTHRITIS     Surgeon(s) and Role:     * Osmin Peralta MD - Primary     * Julio Proctor MD - Assisting    Anesthesia: General     Procedure: Procedure(s):  RIGHT REVERSE SHOULDER ARTHROPLASTY     Procedure in Detail: INDICATIONS FOR PROCEDURE: The patient is a 75-year-old right-hand dominant female with right shoulder pain. She was found to have a massive irrepairable rotator cuff tear in the settting of glenohumeral arthritis. The patient had failed conservative treatment with injections, physical therapy, activity modification. Her shoulder pain was affecting her quality-of-life and ability to perform ADLs. herefore we discussed surgical management with reverse total shoulder replacement. She Understood the potential risks and benefits including but not limited to bleeding, infection, persistent pain, need for additional surgery in the future, anesthetic risks, damage to nearby structures including tendons/arteries/nerves, stiffness, and cardiopulmonary risks. DESCRIPTION OF PROCEDURE: The patient was taken to the operating room. After undergoing successful general anesthesia and interscalene block, the patient was placed in the beach chair position. The right upper extremity was prepped and draped in the usual sterile manner. Zaire Pilar was used over the planned surgical site. The anatomic landmarks were identified. The posterior and anterior portal sites were injected with 0.5% ropivacaine and 1% lidocaine with epi. We began the procedure by starting with a standard deltopectoral incision extending from the lateral tip of the coracoid to the mid humerus. Skin bleeders were cauterized with electrocautery.  The cephalic vein was identified and mobilized laterally with the deltoid. The deltopectoral interval was established and Kobel retractors were placed. Next and any adhesions in the subacromial space were released. The conjoined tendon was identified and the falciform ligament was released. The axillary nerve was identified. The Kobel retractor was then repositioned under the conjoined tendon. Arm was externally rotated and the 3 sisters were identified and cauterized using the electrocautery. The biceps tendon was not present, consistent with a prior rupture of the long head. The biceps groove was identified and opened all the way to the glenoid insertion opening the rotator interval along the way. The subscapularis was then identified, and a subscapularis peel was performed from superior to inferior. The subscap was tagged with 2 #2 FiberWire sutures in Mikel-Mike fashion. At this point the capsule was released from the humerus. A Hohmann retractor was placed along the medial border of the humerus to protect the axillary nerve. Arm was taken out of the arm sweeney and placed in flexion and external rotation to release down to the 6 oclock position. Osteophytes were resected from the humeral head. A complete tear off the rotator cuff was noted. Therefore we continued to move forward with a reverse total shoulder replacement. An extra medullary guide was used, and a humeral head cut was made at approximately 135°. At this point a humeral head protector was placed. Our attention then turned to the glenoid. A Fakuda retractor was placed along the posterior glenoid, a Hohmann was placed superiorly along the glenoid, and another Hohmann just anterior to the subscap after identifying the axillary nerve. The capsule was then  from the subscapularis and resected. The anterior Hohmann retractor was then repositioned to the anterior glenoid, just posterior to the subscapularis therefore protecting the axillary nerve.  The remaining biceps tendon stump and surrounding labrum was then resected using electrocautery as well as a long handled knife. The base of the coracoid was identified and soft tissue released. The 3D printed patient specific guide was then placed over the coracoid to localize our central guidepin location. Once the guide sat flush a central pin was placed and noted good bicortical fixation. The position was checked grossly, and aligned with our preoperative planning template. Next the guide tap was inserted. The glenoid was then reamed with the appropriate sized reamer. At this point irrigation with the pulse lavage was performed. Adequate reaming was noted, with preferential reaming at the inferior glenoid. The baseplate was then inserted. Using a 4-hole drill guide for locking screws were then placed starting at the 12 oclock position and working our way around clockwise. The plane was used around the baseplate room to remove any remaining bone. At this point a DJO 32-4 Alticate glenosphere was then inserted and held in place with the retaining screw. Retractors were then removed and our attention turned back to the humerus. Deltoid Brown retractor was placed, a soft plastic Ronda was placed in the joint, and a Hohmann was placed along the neck of the humerus. A half-inch osteotome was then used to remove a central plug of bone from the humerus at the humeral head cut. This ensured a proper metaphyseal centering technique. At this point bone from the osteotome cut as well as from the humeral head was prepared on the back table for later bone grafting. Using our centering cut as a guide the humeral socket was reamed. Next using the humeral canal reamer, the canal was gently sounded up to a size 10. To ensure proper metaphyseal centering we then chose to a 8 mm short small stem as our final implant. At this point a 2 mm drill was used to create bone tunnels for later subscapularis repair.  3 drill holes were made in the bicipital groove, and 2 were made in the anatomic footprint of the subscapularis. At this point the pulse lavage is used to copious irrigate humerus. #5 FiberWire was then passed from lateral to medial, first through the bicipital groove hole, and then through the anatomic footprint hole. This was passed from superior to inferior for a total of 3 #5 FiberWire sutures. The humeral stem was then inserted, ensuring the FiberWire sutures were placed around the stem. This was inserted and impacted in place with the humeral socket insert. We noted excellent fixation of our humeral implant. Next a trial insert with a 32 neutral liner was placed, and the shoulder was reduced. At this point we noted ~2mm of shucking, the patient had excellent internal and external range of motion, we were able to reach her head, her mouth, and to her abdomen without any impingement. Without much difficulty the trial implant was able to be reduced. The trial was then removed, copious irrigation was then performed, and a final 32 neutral liner was then inserted. The arm was then reduced, and again taken through a range of motion noting good range of motion and excellent stability. At this point the subscap was again identified and noted to easily be brought back to the anatomic footprint. Therefore each limb of the #5 FiberWire was then passed in simple fashion starting inferiorly and working inferiorly. These were tied sequentially repairing our subscapularis down to the anatomic footprint. The arm was then taken through a range of motion and noted fixation of the rotator cuff as well as good range of motion. The surgical site was copiously irrigated with normal saline and pulse lavage. The deltopectoral interval was closed with a running 0 Vicryl suture. Deep skin layer was closed using 2-0 Monocryl suture the subcuticular layer was then closed using 3-0 Monocryl suture. Sterile dressing was applied. The patient was placed in a sling with small abduction pillow.  The patient was awakened and taken to the recovery room in stable condition. The sponge, instrument, and needle counts were correct. POSTOPERATIVE PLAN: The patient will undergo physical therapy with emphasis on our reverse total shoulder arthroplasty protocol       Estimated Blood Loss:  100cc    Tourniquet Time: * No tourniquets in log *      Implants:   Implant Name Type Inv.  Item Serial No.  Lot No. LRB No. Used Action   BASEPLATE ISAMAR 30 MM P2 COAT [facility] - [id number]  BASEPLATE ISAMAR 30 MM P2 COAT [facility] [id number] Right 1 Implanted   SCREW BNE L14MM DIA5MM TI NONLOCKING FOR ISAMAR BASEPLT FIX - [id number]  SCREW BNE L14MM DIA5MM TI NONLOCKING FOR ISAMAR BASEPLT FIX  [facility] [id number] Right 1 Implanted   SCREW BNE L14MM DIA5MM TI NONLOCKING FOR ISAMAR BASEPLT FIX - [id number]  SCREW BNE L14MM DIA5MM TI NONLOCKING FOR ISAMAR BASEPLT FIX  [facility] [id number] Right 1 Implanted   SCREW BNE L22MM DIA5MM TI NONLOCKING FOR ISAMAR BASEPLT FIX - [id number]  SCREW BNE L22MM DIA5MM TI NONLOCKING FOR ISAMAR BASEPLT FIX  [facility] [id number] Right 1 Implanted   SPHERE ISAMAR [id number] -4MM OFFSET W/ RET SCR [facility] - [id number]  SPHERE ISAMAR [id number] -4MM OFFSET W/ RET SCR [facility] MEDICAL [facility] [id number] Right 1 Implanted   SCREW BNE L26MM DIA5MM TI NONLOCKING FOR ISAMAR BASEPLT FIX - [id number]  SCREW BNE L26MM DIA5MM TI NONLOCKING FOR ISAMAR BASEPLT FIX  [facility] [id number] Right 1 Implanted   STEM HUM SHELL SM 8X48 MM SHLDR ALTIVATE RVS - [id number]  STEM HUM SHELL SM 8X48 MM SHLDR ALTIVATE S  [facility] MEDICAL [facility] [id number] Right 1 Implanted   INSERT HUM SM SOCKET STD 32 MM SHLDR E+ POLYETH ALTIVATE RVS - [id number]  INSERT HUM SM SOCKET STD 32 MM SHLDR E+ POLYETH ALTIVATE [facility]  [employer] DJO SURGICAL_WD [id number] Right 1 Implanted               Specimens: * No specimens in log * Drains: None                Complications: None    Counts: Sponge and needle counts were correct times two.     Signed By:  Gay Urena MD     December 28, 2022

## 2022-12-28 NOTE — H&P
Orthopaedic Surgery - Sports Medicine  History and Physical Examination      CHIEF COMPLAINT: right shoulder pain    HISTORY OF PRESENT ILLNESS: The patient is a 73-year-old right-hand dominant female who comes in today for planned reverse right shoulder arthroplasty. The patient is in continued pain which is affecting her quality-of-life and ability to perform ADLs. By way of history, the patient reports on July 18, 2022, she was just sitting in the kitchen talking on the phone, when she had onset of acute right shoulder pain. The pain was localized to the anterior lateral aspect of her shoulder, but with some radiation to her elbow and to her neck. The patient states since then she has not been able to lift her arm due to pain/weakness. Her pain in her shoulder is worse at night and also with any attempt at motion. The patient was initially seen in the emergency department, who placed the patient in a sling after obtaining an x-ray. The patient subsequently got an MRI of her right shoulder and was sent to me for further evaluation and treatment. He complains of 8 out of 10 pain today. She is taking Aleve for pain control. CURRENT MEDICATIONS: Metformin, glipizide, metoprolol, aspirin, Prilosec, trulicity, losartan, hydroxyzine  ALLERGIES: Vicodin/Tramadol/gabapentin: Nausea/vomiting  PAST MEDICAL HISTORY: Diabetes, hypertension  SOCIAL HISTORY: The patient is , denies tobacco, alcohol, or drug use.     REVIEW OF SYSTEMS:  Constitutional: Denies fevers, chills, weight loss   ENT: negative for eye changes, oral lesions, difficulty swallowing   Cardiac: Denies chest pain or palpitations   Lungs: Denies cough or shortness of breath   GI: Denies bowel dysfunction, constipation, or diarrhea  : Denies dysuria, hematuria   Skin: Denies rashes or other skin lesions   MSK: Negative except as mentioned in history  Lymph: Denies enlarged nodes, night sweats, abnormal bleeding   Psych: negative for depressed mood, anxiety   Neuro: negative for confusion, weakness, sensory changes    PHYSICAL EXAMINATION:      On physical examination, the patient is well nourished, healthy appearing, and in no acute distress, very pleasant and cooperative throughout the exam. The patient is alert and oriented to person, place, and time with a normal affect. Cervical spine: normal and painless flexion, extension, lateral bending and rotation    Right Shoulder:   -Inspection: skin clean/dry/intact, no erythema, fluctuance, or abnormal lesions; no evidence of muscular atrophy, no evidence of scapulothoracic dyskinesis  -Palpation:  -no tenderness over bicipital groove  -no tenderness over the [facility]  -no other areas of focal tenderness throughout the scapula or shoulder region  -PROM:  -Right shoulder: ,  ER-S 40  -Left shoulder: ,  ER-S 60  -AROM:  -Right shoulder: , ER-S 40, IR to back pocket  -Left shoulder: , ER-S 60, IR to L1  -Impingement: pain with Neer/Garcia impingement manuevers  -Strength:  4/5 strength to resisted FF with pain  4/5 strength to resisted ER-neurtral with pain  5-/5 strength to resisted IR-neurtral without pain  5-/5 strength to Belly press without pain  -Special Tests:  -negative lag sign  -Positive O'Briens    -Neurovascular: intact distally, palpable radial pulse, hand warm, motor function intact to axillary/AIN/PIN/ulnar nerve function, sensation intact to light touch in axillary median, radial, and ulnar nerve distributions    DIAGNOSTIC STUDIES:    3 view right shoulder x-ray to July 2022: Multiple areas of calcifications about the humeral head. The humeral head is well centered on the glenoid. There are mild/moderate glenohumeral degenerative changes. AC joint demonstrates moderate/severe degenerative changes. Right shoulder MRI 8/10/2022: Moderate/severe degenerative changes of the glenohumeral joint.   Full-thickness tear of the supraspinatus and infraspinatus with 4 cm of retraction. Tendinosis of the subscapularis. Biceps tendon tear. Degenerative tearing of the superior labrum. Moderate AC joint degenerative changes. ASSESSMENT and PLAN: The patient is a 66yo F with right shoulder pain, with findings most consistent with glenohumeral arthritis and a massive rotator cuff tear. I had a long discussion with the patient regarding today's evaluation. She had acute onset of right shoulder pain in the setting of severe glenohumeral arthritis as well as a large rotator cuff tear. She had partial relief with a corticosteroid injection and physical therapy, but is still limited due to her pain. At this time we discussed rational treatment options with the patient including operative and continued non-operative management as well as the Risks, benefits and alternatives of each. Conservative management would consist of physical therapy as well as possible additional injections. Surgery would consist of shoulder arthroscopy with rotator cuff and joint debridement versus reverse total shoulder arthroplasty. The patient is interested in a surgical management of her shoulder pain. Given her advanced arthritis I did discuss with her that the outcomes of any arthroscopic management would be much less predictable. Therefore she would like to move forward with shoulder arthroplasty. She understands the risks, benefits, and alteratives. Including the risk of (but not limited to) infection, bleeding, damage to nearby structures including arteries and nerves, need for additional surgery, continued pain/dysfunction, complications with anesthesia including cardiopulmonary complications, and she wishes to proceed. All questions were answered and the patient was in agreement with this plan. NPO, MANUELITO RODRÍGUEZ.        Ender Abbott MD  Orthopaedic Surgery Department

## 2022-12-28 NOTE — BRIEF OP NOTE
Brief Postoperative Note    Patient: Brian Bell  YOB: 1945  MRN: [id number]    Date of Procedure: 12/28/2022     Pre-Op Diagnosis: RIGHT SHOULDER ROTATOR CUFF ARTHROPATHY     Post-Op Diagnosis: Same as preoperative diagnosis. Procedure(s):  RIGHT REVERSE SHOULDER ARTHROPLASTY    Surgeon(s):  MD Chandrakant Rebollar MD    Surgical Assistant: None    Anesthesia: General     Estimated Blood Loss (mL): less than 835     Complications: None    Specimens: * No specimens in log *     Implants:   Implant Name Type Inv.  Item Serial No.  Lot No. LRB No. Used Action   BASEPLATE ISAMAR 30 MM P2 COAT [facility] - [id number]  BASEPLATE ISAAMR 30 MM P2 COAT [facility]  [facility] [employer] [facility] [id number] Right 1 Implanted   SCREW BNE L14MM DIA5MM TI NONLOCKING FOR ISAMAR BASEPLT FIX - [id number]  SCREW BNE L14MM DIA5MM TI NONLOCKING FOR ISAMAR BASEPLT FIX  [facility] [employer] [facility] [id number] Right 1 Implanted   SCREW BNE L14MM DIA5MM TI NONLOCKING FOR ISAMAR BASEPLT FIX - [id number]  SCREW BNE L14MM DIA5MM TI NONLOCKING FOR IASMAR BASEPLT FIX  [facility] [employer] [facility] [id number] Right 1 Implanted   SCREW BNE L22MM DIA5MM TI NONLOCKING FOR ISAMAR BASEPLT FIX - [id number]  SCREW BNE L22MM DIA5MM TI NONLOCKING FOR ISAMAR BASEPLT FIX  [facility] [employer] [facility] [id number] Right 1 Implanted   SPHERE ISAMAR [id number] -4MM OFFSET W/ RET SCR [facility] - [id number]  SPHERE ISAMAR [id number] -4MM OFFSET W/ RET SCR [facility]  [facility] [employer] [facility] [id number] Right 1 Implanted   SCREW BNE L26MM DIA5MM TI NONLOCKING FOR ISAMAR BASEPLT FIX - [id number]  SCREW BNE L26MM DIA5MM TI NONLOCKING FOR ISAMAR BASEPLT FIX  [facility] [employer] [facility] [id number] Right 1 Implanted   STEM HUM SHELL SM 8X48 MM SHLDR ALTIVATE RVS - [id number]  STEM HUM SHELL SM 8X48 MM SHLDR ALTIVATE RVS  [employer] SURGICAL_WD [id number] Right 1 Implanted   INSERT HUM SM SOCKET STD 32 MM SHLDR E+ POLYETH ALTIVATE RVS - [id number]  INSERT HUM SM SOCKET STD 32 MM SHLDR E+ POLYETH ALTIVATE RVS  [facility] SURGICAL_WD [id number] Right 1 Implanted       Drains: * No LDAs found *    Findings: see formal op report    Electronically Signed by Montana Victoria MD on 12/28/2022 at 4:52 PM

## 2022-12-28 NOTE — PERIOP NOTES
TRANSFER - IN REPORT:    Verbal report received from OR Nurse and CRNA(name) on [address]  being received from OR(unit) for routine post - op      Report consisted of patients Situation, Background, Assessment and   Recommendations(SBAR). Information from the following report(s) SBAR, OR Summary, Procedure Summary, Intake/Output, and MAR was reviewed with the receiving nurse. Opportunity for questions and clarification was provided. Assessment completed upon patients arrival to unit and care assumed.

## 2022-12-28 NOTE — PERIOP NOTES
Reviewed PTA medication list with patient/caregiver and patient/caregiver denies any additional medications. Patient admits to having a responsible adult care for them at home for at least 24 hours after surgery. Patient encouraged to use gown warming system and informed that using said warming gown to regulate body temperature prior to a procedure has been shown to help reduce the risks of blood clots and infection. Patient's pharmacy of choice verified and documented in PTA medication section. Dual skin assessment & fall risk band verification completed with Rose Garcia RN.

## 2022-12-28 NOTE — ANESTHESIA PREPROCEDURE EVALUATION
Relevant Problems   No relevant active problems       Anesthetic History     PONV          Review of Systems / Medical History      Pulmonary  Within defined limits                 Neuro/Psych   Within defined limits           Cardiovascular    Hypertension                   GI/Hepatic/Renal     GERD: well controlled           Endo/Other    Diabetes    Arthritis     Other Findings              Physical Exam    Airway  Mallampati: II  TM Distance: 4 - 6 cm  Neck ROM: normal range of motion   Mouth opening: Normal     Cardiovascular  Regular rate and rhythm,  S1 and S2 normal,  no murmur, click, rub, or gallop             Dental    Dentition: Full upper dentures     Pulmonary  Breath sounds clear to auscultation               Abdominal  GI exam deferred       Other Findings            Anesthetic Plan    ASA: 2  Anesthesia type: general      Post-op pain plan if not by surgeon: peripheral nerve block single    Induction: Intravenous  Anesthetic plan and risks discussed with: Patient      Risks of PNB, including but not limited to bleeding, infection, seizure, nerve injury, and block failure discussed with patient and accepted.

## 2022-12-29 VITALS
SYSTOLIC BLOOD PRESSURE: 135 MMHG | OXYGEN SATURATION: 98 % | BODY MASS INDEX: 30.45 KG/M2 | WEIGHT: 178.35 LBS | RESPIRATION RATE: 18 BRPM | DIASTOLIC BLOOD PRESSURE: 65 MMHG | HEIGHT: 64 IN | HEART RATE: 88 BPM | TEMPERATURE: 97.8 F

## 2022-12-29 PROCEDURE — 97116 GAIT TRAINING THERAPY: CPT

## 2022-12-29 PROCEDURE — 97161 PT EVAL LOW COMPLEX 20 MIN: CPT

## 2022-12-29 PROCEDURE — 97165 OT EVAL LOW COMPLEX 30 MIN: CPT

## 2022-12-29 PROCEDURE — 97535 SELF CARE MNGMENT TRAINING: CPT

## 2022-12-29 PROCEDURE — 74011250637 HC RX REV CODE- 250/637: Performed by: STUDENT IN AN ORGANIZED HEALTH CARE EDUCATION/TRAINING PROGRAM

## 2022-12-29 PROCEDURE — 74011000250 HC RX REV CODE- 250: Performed by: STUDENT IN AN ORGANIZED HEALTH CARE EDUCATION/TRAINING PROGRAM

## 2022-12-29 PROCEDURE — 74011250636 HC RX REV CODE- 250/636: Performed by: STUDENT IN AN ORGANIZED HEALTH CARE EDUCATION/TRAINING PROGRAM

## 2022-12-29 RX ADMIN — GLIPIZIDE 10 MG: 2.5 TABLET, EXTENDED RELEASE ORAL at 09:48

## 2022-12-29 RX ADMIN — DOCUSATE SODIUM 50 MG AND SENNOSIDES 8.6 MG 1 TABLET: 8.6; 5 TABLET, FILM COATED ORAL at 09:48

## 2022-12-29 RX ADMIN — PANTOPRAZOLE SODIUM 40 MG: 40 TABLET, DELAYED RELEASE ORAL at 07:00

## 2022-12-29 RX ADMIN — NAPROXEN 500 MG: 250 TABLET ORAL at 09:48

## 2022-12-29 RX ADMIN — OXYCODONE AND ACETAMINOPHEN 1 TABLET: 10; 325 TABLET ORAL at 09:50

## 2022-12-29 RX ADMIN — CEFAZOLIN 2 G: 10 INJECTION, POWDER, FOR SOLUTION INTRAVENOUS at 09:50

## 2022-12-29 RX ADMIN — ASPIRIN 81 MG: 81 TABLET, COATED ORAL at 09:49

## 2022-12-29 NOTE — PROGRESS NOTES
Ana care of patient at this time. Patient AAOx3. PIV is C/D/I with fluids infusing, no s/s of infiltration or phlebitis. VSS on RA. Lung sounds clear bilaterally. Patient reaching 1000 on IS. Sensation and radial pulses intact to BUE and BLE. Dressing to right shoulder is C/D/I, sling intact to RUE. No other concerns at this time. Possessions and call bell within reach, will continue to monitor. 9932  Patient states pain 10/10, meds given per mar. 1151  AVS meds reviewed with Jojo Eng RN. Discharge paperwork reviewed with patient and spouse, all questions answered. No other concerns at this time. PIV removed, catheter tip intact upon removal. Covered with gauze and tape.

## 2022-12-29 NOTE — PROGRESS NOTES
Transition of Care (SUSHIL) Plan:    Outpt therapy and physician follow up     Chart reviewed. Pt admitted for an elective surgical procedure ( RIGHT SHOULDER ROTATOR CUFF ARTHROPATHY). Pt is  and her  will assist as needed. Please encourage ambulation. Noted plan for outpt therapy. No other transition of care needs identified at this time. Anticipate pt will be medically stable for discharge within the next 24-48 hours with physician follow up and out pt therapy. CM remains available to assist as needed. SUSHIL Transportation:   How is patient being transported at discharge? Family/Friend      When? Once cleared by physician     Is transport scheduled? N/A      Follow-up appointment and transportation:   PCP/Specialist?  See AVS for Appointment         Who is transporting to the follow-up appointment? Self/Family/Friend      Is transport for follow up appointment scheduled? N/A    Communication plan (with patient/family): Who is being called? Patient or Next of Kin? Responsible party? Patient      What number(s) is to be used? See Facesheet      What service provider is calling for [facility] services? When are they calling? Readmission Risk? (Green/Low; Yellow/Moderate; Red/High):  [employer] here to complete [address] including selection of the Healthcare Decision Maker Relationship (ie \"Primary\")      Care Management Interventions  Mode of Transport at Discharge: Other (see comment) (Family)  Transition of Care Consult (CM Consult): Discharge Planning  Health Maintenance Reviewed: Yes  Support Systems: Spouse/Significant Other  Confirm Follow Up Transport: Family  The Plan for Transition of Care is Related to the Following Treatment Goals :  Outpt PT  Discharge Location  Patient Expects to be Discharged to[de-identified] Other: (Out pt therapy)

## 2022-12-29 NOTE — ANESTHESIA POSTPROCEDURE EVALUATION
Procedure(s):  RIGHT REVERSE SHOULDER ARTHROPLASTY.     general    Anesthesia Post Evaluation      Multimodal analgesia: multimodal analgesia used between 6 hours prior to anesthesia start to PACU discharge  Patient location during evaluation: PACU  Patient participation: complete - patient participated  Level of consciousness: awake and alert  Pain score: 0  Airway patency: patent  Anesthetic complications: no  Cardiovascular status: acceptable  Respiratory status: acceptable  Hydration status: acceptable  Post anesthesia nausea and vomiting:  none  Final Post Anesthesia Temperature Assessment:  Normothermia (36.0-37.5 degrees C)      INITIAL Post-op Vital signs:   Vitals Value Taken Time   /76 12/28/22 1839   Temp 36.3 °C (97.4 °F) 12/28/22 1839   Pulse 80 12/28/22 1839   Resp 18 12/28/22 1839   SpO2 99 % 12/28/22 1839

## 2022-12-29 NOTE — PROGRESS NOTES
Ortho Sports Progress Note     ID: [id number] F with right rotator cuff arthropathy now s/p right reverse total shoulder arthroplasty on 12//28/22. S: no acute events overnight. Pain well controlled now with PO meds. Pt states the block wore off at 6am. She is eager to go home today      Visit Vitals  BP (!) 136/55   Pulse 96   Temp 97.6 °F (36.4 °C)   Resp 18   Ht 5' 4\" (1.626 m)   Wt 178 lb 5.6 oz (80.9 kg)   SpO2 97%   BMI 30.61 kg/m²          Gen: NAD  Cardiac: RRR Via RP  Respiratory: Unlabored on room air     Right upper extremity:  -Dressings in place that is clean, dry, intact. No surrounding erythema or induration. Mild ecchymosis inferior to incision site.  -able to perform TU, OK, X2-3, fire post delt  -Sensation  intact to VIF, VSF, lateral shoulder, and FDWS  2+RP, digits WWP     Imaging: Postop single AP view-hardware in good position without interval failure     A/p: 67yo F with right rotator cuff arthropathy now s/p right reverse total shoulder arthroplasty on 12//28/22.      -ABX: periop abx x 24hrs  -HDS, will CTM  -Diet: regular diet   -NWB RUE, Continue to wear sling woth abduction pillow  -PT/OT: to work with pt in the AM  -Pain well controlled  -Restart home meds POD1  -Anticipate discharge POD1  -Pt will follow up with me in on 11 Jan 2023 at [address] am

## 2022-12-29 NOTE — DISCHARGE INSTRUCTIONS
[facility] ORTHOPEDICS  MD Martin Brennan MD Lindia Hector, DO          DISCHARGE INSTRUCTIONS: SHOULDER REPLACEMENT    Dressing Instructions:   After surgery, the wound is covered with gauze pads and tape. Keep this dressing in place until your first appointment. It is water resistant, but please keep it as dry as possible. You may shower starting 3 days after surgery. Please cover the incision with a clean water proof bandage or saran wrap while showering until your first post op appointment. Please try to keep your incision as dry as possible in the shower. Some water is okay, but we do not want the incision site completely saturated. No submerging (bath, hot-tub, lake, river, ocean) for 6 weeks post op  Do not apply any creams, lotions or ointments to incisions. Please apply a clean dressing daily or a band aid (Dressing supplies [facility] Pain Control:   Please take your prescribed pain medication as needed. Some pain medications contain tylenol/acetaminophen, do not take additional tylenol/acetaminophen with these medications. DO NOT take more than 3 grams of tylenol/acetaminophen in a 24 hour period. This may cause liver damage. Do not use alcohol or marijuana containing products while taking opioid pain medication. If you are taking the narcotic pain reliever regularly, we recommend that you take a stool softener (Colace) as narcotics may cause constipation. Nerve Block:  Anesthesia will typically provide a nerve block prior to surgery. It is your choice as to if you would like to receive the nerve block, but it is generally recommended to help with pain control. This decision is made the day of surgery after the anesthesia team explains the risks and benefits. The nerve block is an injection in the area of your neck that numbs the nerves in your arm to reduce pain after the surgery. The block typically lasts 14 to 24 hours but this is variable. It is not uncommon to lose the ability to move your arm and hand after the block (often described as a dead arm) - this will resolve as the block wears off. Icing: You will receive an ice machine prior to surgery. Icing is important for the first several days post-op. While the post-op dressing is in place, icing should be done continuously. Once the dressing is removed on the third post-operative day, ice is applied for no more than 30-minute periods 3-4 times per day. *If you received a nerve block, be careful of over icing as you cannot feel the sensation in your operated extremity and are at risk for frostbite*. To protect the skin, there should be a fabric barrier at least the thickness of a pillowcase between your skin and ice machine at all times. An elastic bandage, thin clothing, or thick surgical dressing are acceptable layers. Weight Bearing Status: You may not bear weight or lift anything heavier than your cell phone or a cup of coffee with your operative arm, Wiggling of the fingers and movement of the hand is encouraged to help with swelling. Shoulder Sling: A sling with a small pillow will be fitted in the operating room and is to be worn for 6 weeks after surgery. The sling may be completely removed only for showering and physical therapy.     Activity Limits    Showerin Hours  Deskwork: When comfortable with sling  Drivin weeks  Using arm for Activities of Daily Livin weeks  Using arm to reach overhead: 12-16 weeks  Using arm to reach behind back: 12-16 weeks  Using arm to carry objects: 12-16 weeks  Pushing/Pullin weeks    Signs/Symptoms of an Infection:   Redness  Swelling  Increased pain  Increased warmth  Increased Drainage or pus like drainage  Foul odor coming from incision or dressing  Fever and/or chills    Signs/Symptoms of decreased circulation:  Numbness to arm/leg/foot/hand/fingers/toes  Blue nail beds  Pale or cold fingers/toes  Loss of sensation    If you notice any of these signs or anything concerning to you, please, call your surgeons clinic immediately. The [address] [place] [address] clinic # is [phone]   Orthopedic Clinic nurse: RWQGJ - [phone]  Orthopedic NCOIC: MSgt Cait Myers- [phone]      Physical therapy:   Please follow the instructions below:     [employer]: Therapy: Call [phone] LAFJITENDRA, 48hrs after surgery to schedule physical therapy appointment. Be sure to state that you are a surgery patient. DEPENDENTS/RETIREES: Call [phone], 2-7days after surgery, to set up physical therapy appointment. You will need [facility] approval number before starting therapy. Often it takes 1-2weeks to receive this in the mail, however you can call the number above and receive a number verbally and start therapy. [employer]: Report to [facility] physical therapy as a walk-in anytime Mon-Thurs from [phone] for initial evaluation within the first 2weeks after surgery. Be sure to state that you just had surgery. [employer]: Therapy: Call [phone] MAKEDA, 48hrs after surgery to schedule physical therapy appointment. Be sure to state that you are a surgery patient.

## 2022-12-29 NOTE — PROGRESS NOTES
Problem: Self Care Deficits Care Plan (Adult)  Goal: *Acute Goals and Plan of Care (Insert Text)  Description: Occupational Therapy Goals Initiated 12/29/2022 within 7 day(s). 1. Patient will perform toilet transfer with supervision in preparation for bowel and bladder management. 2. Patient will perform bowel and bladder management with supervision for increased independence with ADLs. 3. Patient will perform UB dressing with supervision while utilizing samantha-techniques for increased independence with ADLs. 4. Patient will perform LB dressing with supervision while utilizing samantha-techniques for increased independence with ADLs. 5. Patient will perform bathing w/ supervision w/ A/E for increased independence with ADLs. 6. Patient will perform grooming standing at sink with supervision for increased independence with ADLs. Outcome: Progressing Towards Goal  OCCUPATIONAL THERAPY EVALUATION    Patient: Catherine Raya (14 y.o. female)  Date: 12/29/2022  Primary Diagnosis: Glenohumeral arthritis, right [M19.011]  Acute post-operative pain [G89.18]  Procedure(s) (LRB):  RIGHT REVERSE SHOULDER ARTHROPLASTY (Right) 1 Day Post-Op   Precautions: Fall, WBAT  PLOF: pt independent for ADLs/functional mobility    ASSESSMENT AND RECOMMENDATIONS:  Based on the objective data described below, the patient presents with RUE decreased ROM and strength affecting UE ADLs. Pt found supine in bed, reporting pain 7/10, agreeable to therapy. Pt able to sit up to EOB with SBA. Educated pt on proper body mechanics for ADLs s/p TSR. Instructed pt on adaptive strategies and clothing modifications for increased independence. Pt completed upper body dressing with min A. Educated pt on proper use of sling and positioned for comfort. Pt required CGA to complete lower body dressing. Pt CGA/SBA for STS/bathroom mobility with HHA. Pt ambulated back to EOB, friend present during session for education on home safety.  Provided opportunity for pt to voice questions on ADL performance when home, pt has no further concerns. Education: Reviewed home safety, body mechanics, importance of moving every hour to prevent joint stiffness, Pendulum Exercises, roll of ice for edema/pain control, one-handed techniques, proper positioning of sling, and adaptive dressing techniques with patient verbalizing understanding at this time     Patient will benefit from skilled intervention to address the above impairments. Patient's rehabilitation potential is considered to be Good  Factors which may influence rehabilitation potential include:   [x]             None noted  []             Mental ability/status  []             Medical condition  []             Home/family situation and support systems  []             Safety awareness  []             Pain tolerance/management  []             Other:      PLAN :  Recommendations and Planned Interventions:   [x]               Self Care Training                  [x]      Therapeutic Activities  [x]               Functional Mobility Training   []      Cognitive Retraining  [x]               Therapeutic Exercises           []      Endurance Activities  []               Balance Training                    []      Neuromuscular Re-Education  []               Visual/Perceptual Training     [x]      Home Safety Training  [x]               Patient Education                   [x]      Family Training/Education  []               Other (comment):    Frequency/Duration: Patient will be followed by Occupational Therapy 1-2 times per day/4-7 days per week to address goals. Discharge Recommendations: Home Health  Further Equipment Recommendations for Discharge: N/A    AMPAC: Based on an AM-PAC score of 18/24 and their current ADL deficits; it is recommended that the patient have 3-5 sessions per week of Occupational Therapy at d/c to increase the patient's independence.       This AMPAC score should be considered in conjunction with interdisciplinary team recommendations to determine the most appropriate discharge setting. Patient's social support, diagnosis, medical stability, and prior level of function should also be taken into consideration. SUBJECTIVE:   Patient stated I've been wanting to get up all night.     OBJECTIVE DATA SUMMARY:     Past Medical History:   Diagnosis Date    Arthritis     Diabetes ([address]     Type II    High cholesterol     Hypertension     Nausea & vomiting     \"I get real sick after anesthesia, vomitted\"     Past Surgical History:   Procedure Laterality Date    HX BREAST LUMPECTOMY Right 2005    HX COLONOSCOPY      HX HYSTERECTOMY  1979    HX TUBAL LIGATION       Barriers to Learning/Limitations: yes;  physical  Compensate with: visual, verbal, tactile, kinesthetic cues/model    Home Situation/Prior Level of Function:   Home Situation  Home Environment: Private residence  # Steps to Enter: 4  Rails to Enter: Yes  Hand Rails : Bilateral  One/Two Story Residence: One story  Living Alone: No  Support Systems: Spouse/Significant Other, Other Family Member(s)  Patient Expects to be Discharged to[de-identified] Home with home health  Current DME Used/Available at Home: Raised toilet seat  Tub or Shower Type: Tub/Shower combination  []  Right hand dominant   []  Left hand dominant    Cognitive/Behavioral Status:  Neurologic State: Alert  Orientation Level: Oriented X4  Cognition: Appropriate decision making; Appropriate for age attention/concentration; Appropriate safety awareness; Follows commands  Safety/Judgement: Awareness of environment    Skin: R shoulder shoulder incision w/ dressing/Mepilex  Edema: compression hose in place & applied ice     Coordination: BUE  Coordination: Generally decreased, functional  Fine Motor Skills-Upper: Left Intact; Right Intact    Gross Motor Skills-Upper: Left Intact; Right Intact    Balance:  Sitting: Intact  Standing: Intact    Strength: BUE  Strength: Generally decreased, functional    Tone & Sensation:BUE  Tone: Normal  Sensation: Intact    Range of Motion: BUE  AROM: Generally decreased, functional  PROM: Generally decreased, functional    Functional Mobility and Transfers for ADLs:  Bed Mobility:  Supine to Sit: Stand-by assistance  Scooting: Stand-by assistance  Transfers:  Sit to Stand: Contact guard assistance;Stand-by assistance    ADL Assessment:  Feeding: Setup  Oral Facial Hygiene/Grooming: Stand-by assistance  Bathing: Moderate assistance  Upper Body Dressing: Minimum assistance  Lower Body Dressing: Contact guard assistance  Toileting: Stand by assistance    ADL Intervention:  Upper Body Dressing Assistance  Dressing Assistance: Minimum assistance  Shirt simulation with hospital gown: Minimum  assistance    Lower Body Dressing Assistance  Dressing Assistance: Contact guard assistance  Underpants: Contact guard assistance  Leg Crossed Method Used: Yes  Position Performed: Seated edge of bed  Cues: Visual cues provided;Verbal cues provided    Cognitive Retraining  Safety/Judgement: Awareness of environment    Pain:  Pain level pre-treatment: 7/10  Pain level post-treatment: 7/10  Pain Intervention(s): Rest, Ice, Repositioning   Response to intervention: Nurse notified, see doc flow sheet    Activity Tolerance:   Fair. Patient able to stand ~5 minute(s). Patient able to complete ADLs with intermittent rest breaks. Patient limited by pain, strength, ROM. Patient unsteady. Please refer to the flowsheet for vital signs taken during this treatment. After treatment:   []  Patient left in no apparent distress sitting up in chair  [x]  Patient sitting on EOB  []  Patient left in no apparent distress in bed  [x]  Call bell left within reach  [x]  Nursing notified  [x]  Caregiver present  [x]  Ice applied  []  SCD's on while back in bed  [] Bed alarm activated    COMMUNICATION/EDUCATION:   Communication/Collaboration:  [x]       Role of Occupational Therapy in the acute care setting.   [x]      Home safety education was provided and the patient/caregiver indicated understanding. [x]      Patient/family have participated as able in goal setting and plan of care. [x]      Patient/family agree to work toward stated goals and plan of care. []      Patient understands intent and goals of therapy, but is neutral about his/her participation. []      Patient is unable to participate in plan of care at this time. Thank you for this referral.  Jodi Romero OTR/L  Time Calculation: 23 mins    Eval Complexity: History: MEDIUM Complexity : Expanded review of history including physical, cognitive and psychosocial  history ; Examination: LOW Complexity : 1-3 performance deficits relating to physical, cognitive , or psychosocial skils that result in activity limitations and / or participation restrictions ; Decision Making:LOW Complexity : No comorbidities that affect functional and no verbal or physical assistance needed to complete eval tasks         [address] [place] [address] [address] [id number] AM-PAC® Daily Activity Inpatient Short Form (6-Clicks)    How much HELP from another person does the patient currently need    (If the patient hasn't done an activity recently, how much help from another person do you think he/she would need if he/she tried?)   Total (Total A or Dep)   A Lot  (Mod to Max A)   A Little (Sup or Min A)   None (Mod I to I)   Putting on and taking off regular lower body clothing? [] 1 [] 2 [x] 3 [] 4   2. Bathing (including washing, rinsing,      drying)? [] 1 [] 2 [x] 3 [] 4   3. Toileting, which includes using toilet, bedpan or urinal?   [] 1 [] 2 [x] 3 [] 4   4. Putting on and taking off regular upper body clothing? [] 1 [] 2 [x] 3 [] 4   5. Taking care of personal grooming such as brushing teeth? [] 1 [] 2 [x] 3 [] 4   6. Eating meals?    [] 1 [] 2 [x] 3 [] 4     Based on an AM-PAC score of 18/24 and their current ADL deficits; it is recommended that the patient have 3-5 sessions per week of Occupational Therapy at d/c to increase the patient's independence.

## 2022-12-29 NOTE — ROUTINE PROCESS
Bedside and Verbal shift change report given to Amna Hedrick RN (oncoming nurse) by Vasquez Tan RN (offgoing nurse). Report included the following information SBAR, Kardex, Intake/Output, MAR, and Recent Results.

## 2022-12-29 NOTE — PROGRESS NOTES
Problem: Diabetes Self-Management  Goal: *Disease process and treatment process  Description: Define diabetes and identify own type of diabetes; list 3 options for treating diabetes. Outcome: Progressing Towards Goal  Goal: *Incorporating nutritional management into lifestyle  Description: Describe effect of type, amount and timing of food on blood glucose; list 3 methods for planning meals. Outcome: Progressing Towards Goal  Goal: *Incorporating physical activity into lifestyle  Description: State effect of exercise on blood glucose levels. Outcome: Progressing Towards Goal  Goal: *Developing strategies to promote health/change behavior  Description: Define the ABC's of diabetes; identify appropriate screenings, schedule and personal plan for screenings. Outcome: Progressing Towards Goal  Goal: *Using medications safely  Description: State effect of diabetes medications on diabetes; name diabetes medication taking, action and side effects. Outcome: Progressing Towards Goal  Goal: *Monitoring blood glucose, interpreting and using results  Description: Identify recommended blood glucose targets  and personal targets. Outcome: Progressing Towards Goal  Goal: *Prevention, detection, treatment of acute complications  Description: List symptoms of hyper- and hypoglycemia; describe how to treat low blood sugar and actions for lowering  high blood glucose level. Outcome: Progressing Towards Goal  Goal: *Prevention, detection and treatment of chronic complications  Description: Define the natural course of diabetes and describe the relationship of blood glucose levels to long term complications of diabetes.   Outcome: Progressing Towards Goal  Goal: *Developing strategies to address psychosocial issues  Description: Describe feelings about living with diabetes; identify support needed and support network  Outcome: Progressing Towards Goal  Goal: *Insulin pump training  Outcome: Progressing Towards Goal  Goal: *Sick day guidelines  Outcome: Progressing Towards Goal  Goal: *Patient Specific Goal (EDIT GOAL, INSERT TEXT)  Outcome: Progressing Towards Goal     Problem: Patient Education: Go to Patient Education Activity  Goal: Patient/Family Education  Outcome: Progressing Towards Goal Detail Level: Zone Hide Include Location In Plan Question?: No Include Location In Plan?: Yes

## 2022-12-29 NOTE — DISCHARGE SUMMARY
Admit date: 12/28/2022   Admitting Provider: Batsheva Booker MD    Discharge date: 12/29/2022  Discharging Provider: Batsheva Booker MD      * Admission Diagnoses: Glenohumeral arthritis, right [M19.011]; Acute post-operative pain [G89.18]    * Discharge Diagnoses:    Hospital Problems as of 12/29/2022 Date Reviewed: 12/28/2022            Codes Class Noted - Resolved POA    Glenohumeral arthritis, right ICD-10-CM: M19.011  ICD-9-CM: 715.91  12/28/2022 - Present Unknown        Acute post-operative pain ICD-10-CM: G89.18  ICD-9-CM: 338.18  12/28/2022 - Present Unknown           * Hospital Course: The patient was admitted to the hospital on 12/28/2022 following her right reverse total shoulder arthroplasty. The patient was admitted for postoperative pain control, PT and monitoring. The patient did well overnight requiring only 1 dose of narcotic pain medication in the AM.  Postoperative antibiotics (Ancef) were given for 24 hours after surgery. The patient ambulated on postoperative day 0. She had no nausea/vomiting/diarrhea. She had no difficulties with shortness of breath, and is tolerating a regular p.o. diet. The patient was seen and evaluated by Occupational Therapy on postoperative day 1. Patient was appropriate for discharge on postoperative day 1        * Procedures:Procedure(s):  RIGHT REVERSE SHOULDER ARTHROPLASTY      Consults: None    Significant Diagnostic Studies: radiology: X-Ray: hardware in good position on single AP view    Discharge Exam:    Gen: NAD  Cardiac: RRR Via RP  Respiratory: Unlabored on room air     Right upper extremity:  -Dressings in place that is clean, dry, intact. No surrounding erythema or induration.   Mild ecchymosis inferior to incision site.  -able to perform TU, OK, X2-3, fire post delt  -Sensation  intact to VIF, VSF, lateral shoulder, and FDWS  2+RP, digits WWP     I  * Discharge Condition: stable  * Disposition: Home    Discharge Medications:  Current Discharge Medication List          * Follow-up Care/Patient Instructions:   Activity: See surgical instructions, sling x 6wks, NWB RUE, no driving x 6wks  Diet: Regular Diet  Wound Care: Keep wound clean and dry, Ice to area for comfort, and As directed    Follow-up Information       Follow up With Specialties Details Why Contact Info    Linda Alberto MD Orthopedic Surgery Follow up on 1/11/2023 Follow up appointment @ 9:45am [address]  [phone]              Signed:  Myrtle Amor MD  12/29/2022  9:54 AM .

## 2022-12-29 NOTE — PROGRESS NOTES
Problem: Mobility Impaired (Adult and Pediatric)  Goal: *Acute Goals and Plan of Care (Insert Text)  Description: PT goals to be met in 1 day:  Pt will be able to perform supine<>sit SBA for transfers at home. Pt will be able to perform sit<>stand SBA for increased ability to transfer at home safely. Pt will be able to participate in gt training >100' w/ R shoulder immobilizer, GB and CGA/SBA for improved ability in home upon d/c. Pt will be able to perform stair training step to pattern, B/U rail and CGA to obtain safe entry into home upon d/c. Pt will be educated regarding HEP per MD protocol for optimal AROM/strength outcomes. Note: [x]  Patient has met MD mobilization critieria for d/c home   [x]  Recommend HH with 24 hour adult care   []  Benefit from additional acute PT session to address:      PHYSICAL THERAPY EVALUATION    Patient: Ivory Liriano (5 y.o. female)  Date: 12/29/2022  Primary Diagnosis: Glenohumeral arthritis, right [M19.011]  Acute post-operative pain [G89.18]  Procedure(s) (LRB):  RIGHT REVERSE SHOULDER ARTHROPLASTY (Right) 1 Day Post-Op   Precautions:   Fall, WBAT  PLOF: Independent    ASSESSMENT :  Based on the objective data described below, decreased mobility in regards to bed mobility, transfers, gt quality and tolerance, balance, stair negotiation and safety due to R TSA rev surgery. Limited AROM due to surgery R UE, dec strength of R UE, pain in R UE also impacting pt functional mobility. Pt is R hand dominant. Pt rating pain on numerical pain scale pre/post and during session 7/10. Pt and caregiver ed regarding mobility safety, shoulder precautions, HEP, R UE immobilizer use/donning/doffing, ice application/use, elevation, environmental safety and home safe techniques. Pt supine in bed upon arrival.  Pt able to perform supine>sit SBA additional time and sit<>stand w/ CGA/SBA. Safety vc required throughout session to reinforce safety.   Pt able to participate in gt training using R shoulder immoblizer, GB and CGA w/ antalgic gt pattern. Pt was able to participate in stair training using step to pattern, L UE support and CGA. Answered questions by pt and caregiver in regards to PT and mobility. Pt left sitting EOB w/ all needs within reach. Nurse Manisha Lyons aware of session and outcomes. Recommend HHPT with responsible adult care at least 24 hours upon hospital d/c. Patient will benefit from skilled intervention to address the above impairments. Patient's rehabilitation potential is considered to be Good  Factors which may influence rehabilitation potential include:   []         None noted  []         Mental ability/status  []         Medical condition  []         Home/family situation and support systems  []         Safety awareness  [x]         Pain tolerance/management  []         Other:      PLAN :  Recommendations and Planned Interventions:   [x]           Bed Mobility Training             []    Neuromuscular Re-Education  [x]           Transfer Training                   []    Orthotic/Prosthetic Training  [x]           Gait Training                          [x]    Modalities  [x]           Therapeutic Exercises           [x]    Edema Management/Control  [x]           Therapeutic Activities            [x]    Family Training/Education  [x]           Patient Education  []           Other (comment):    Frequency/Duration: Patient will be followed by physical therapy 1-2 times per day/4-7 days per week to address goals. Discharge Recommendations: Home Health  Further Equipment Recommendations for Discharge: N/A    AMPAC: 18/24    This AMPAC score should be considered in conjunction with interdisciplinary team recommendations to determine the most appropriate discharge setting. Patient's social support, diagnosis, medical stability, and prior level of function should also be taken into consideration.      SUBJECTIVE:   Patient stated I have been wanting to get up.    OBJECTIVE DATA SUMMARY:     Past Medical History:   Diagnosis Date    Arthritis     Diabetes ([facility] [address]     Type II    High cholesterol     Hypertension     Nausea & vomiting     \"I get real sick after anesthesia, vomitted\"     Past Surgical History:   Procedure Laterality Date    HX BREAST LUMPECTOMY Right 2005    HX COLONOSCOPY      HX HYSTERECTOMY  1979    HX TUBAL LIGATION       Barriers to Learning/Limitations: yes;  physical  Compensate with: Visual Cues, Verbal Cues, and Tactile Cues  Home Situation:  Home Situation  Home Environment: Private residence  # Steps to Enter: 4  Rails to Enter: Yes  Hand Rails : Bilateral  One/Two Story Residence: One story  Living Alone: No  Support Systems: Spouse/Significant Other, Other Family Member(s)  Patient Expects to be Discharged to[de-identified] Home with home health  Current DME Used/Available at Home: Raised toilet seat  Tub or Shower Type: Tub/Shower combination  Critical Behavior:  Neurologic State: Alert  Orientation Level: Oriented X4  Cognition: Appropriate decision making; Appropriate for age attention/concentration; Appropriate safety awareness; Follows commands  Safety/Judgement: Awareness of environment  Psychosocial  Patient Behaviors: Calm; Cooperative  Family  Behaviors: Supportive;Calm  Purposeful Interaction: Yes  Skin Condition/Temp: Dry;Warm  Family  Behaviors: Supportive;Calm  Skin Integrity: Incision (comment) (shoulder R)  Skin Integumentary  Skin Color: Appropriate for ethnicity  Skin Condition/Temp: Dry;Warm  Skin Integrity: Incision (comment) (shoulder R)  Turgor: Non-tenting  Strength:    Strength: Generally decreased, functional  Tone & Sensation:   Tone: Normal  Sensation: Intact  Range Of Motion:  AROM: Generally decreased, functional  PROM: Generally decreased, functional  Posture:  Functional Mobility:  Bed Mobility:  Supine to Sit: Stand-by assistance  Scooting: Stand-by assistance  Transfers:  Sit to Stand: Contact guard assistance;Stand-by assistance (vc)  Stand to Sit: Stand-by assistance (vc)  Balance:   Sitting: Intact  Standing: Intact  Wheelchair Mobility:  Ambulation/Gait Training:  Distance (ft): 100 Feet (ft)  Assistive Device: Gait belt;Brace/Splint (L HHA)  Ambulation - Level of Assistance: Contact guard assistance; Additional time (vc)  Gait Abnormalities: Altered arm swing  Base of Support: Narrowed  Speed/Miya: Slow  Step Length: Left shortened;Right shortened  Interventions: Safety awareness training; Tactile cues; Verbal cues; Visual/Demos  Stairs:  Number of Stairs Trained: 1  Stairs - Level of Assistance: Contact guard assistance (vc)  Rail Use: Left      Therapeutic Exercises:   Encouraged HEP  Pain:  Pain level pre-treatment: 7/10   Pain level post-treatment: 7/10   Pain Intervention(s) : Medication (see MAR); Rest, Ice, Repositioning  Response to intervention: Nurse notified, See doc flow    Activity Tolerance:   Fair  Please refer to the flowsheet for vital signs taken during this treatment. After treatment:   [x]         Patient left in no apparent distress sitting up in chair  []         Patient left in no apparent distress in bed  [x]         Call bell left within reach  [x]         Nursing notified  [x]         Caregiver present  []         Bed alarm activated  []         SCDs applied    COMMUNICATION/EDUCATION:   [x]         Role of Physical Therapy in the acute care setting. [x]         Fall prevention education was provided and the patient/caregiver indicated understanding. [x]         Patient/family have participated as able in goal setting and plan of care. [x]         Patient/family agree to work toward stated goals and plan of care. []         Patient understands intent and goals of therapy, but is neutral about his/her participation. []         Patient is unable to participate in goal setting/plan of care: ongoing with therapy staff.  []         Other:     Thank you for this referral.  Irina Fields, PT   Time Calculation: 23 mins      Eval Complexity: History: HIGH Complexity :3+ comorbidities / personal factors will impact the outcome/ POC Exam:MEDIUM Complexity : 3 Standardized tests and measures addressing body structure, function, activity limitation and / or participation in recreation  Presentation: LOW Complexity : Stable, uncomplicated  Clinical Decision Making:Low Complexity  Overall Complexity:LOW     Anitha Silva AM-PAC® Basic Mobility Inpatient Short Form (6-Clicks) Version 2    How much HELP from another person does the patient currently need    (If the patient hasn't done an activity recently, how much help from another person do you think he/she would need if he/she tried?)   Total (Total A or Dep)   A Lot  (Mod to Max A)   A Little (Sup or Min A)   None (Mod I to I)   Turning from your back to your side while in a flat bed without using bedrails? [] 1 [] 2 [x] 3 [] 4   2. Moving from lying on your back to sitting on the side of a flat bed without using bedrails? [] 1 [] 2 [x] 3 [] 4   3. Moving to and from a bed to a chair (including a wheelchair)? [] 1 [] 2 [x] 3 [] 4   4. Standing up from a chair using your arms (e.g., wheelchair, or bedside chair)? [] 1 [] 2 [x] 3 [] 4   5. Walking in hospital room? [] 1 [] 2 [x] 3 [] 4   6. Climbing 3-5 steps with a railing?+   [] 1 [] 2 [x] 3 [] 4   +If stair climbing cannot be assessed, skip item #6. Sum responses from items 1-5. Based on an AM-PAC score of 18/24 and their current functional mobility deficits, it is recommended that the patient have 3-5 sessions per week of Physical Therapy at d/c to increase the patient's independence.

## 2023-01-03 PROBLEM — M19.011 GLENOHUMERAL ARTHRITIS, RIGHT: Status: RESOLVED | Noted: 2022-12-28 | Resolved: 2023-01-03

## 2023-01-03 PROBLEM — G89.18 ACUTE POST-OPERATIVE PAIN: Status: RESOLVED | Noted: 2022-12-28 | Resolved: 2023-01-03

## 2023-01-30 ENCOUNTER — HOSPITAL ENCOUNTER (OUTPATIENT)
Dept: PHYSICAL THERAPY | Age: 78
Discharge: HOME | End: 2023-01-30
Payer: MEDICARE

## 2023-01-30 PROCEDURE — 97110 THERAPEUTIC EXERCISES: CPT

## 2023-01-30 PROCEDURE — 97140 MANUAL THERAPY 1/> REGIONS: CPT

## 2023-01-30 PROCEDURE — 97161 PT EVAL LOW COMPLEX 20 MIN: CPT

## 2023-01-30 NOTE — PROGRESS NOTES
PT DAILY TREATMENT NOTE/SHOULDER EVAL 10-18    Patient Name: Man Esposito  Date:2023  : 1945  [x]  Patient  Verified  [place]or: [place] MED[place] / Plan: [place] MEDICARE PART A & B / Product Type: Medicare /    In time:1300  Out time:1345  Total Treatment Time (min): 45  Visit #: 1 of 24    Medicare/BCBS Only   Total Timed Codes (min):  23 1:1 Treatment Time:  45     Treatment Area: Pain in right shoulder [M25.511]    SUBJECTIVE  Pain Level (0-10 scale): 0  []constant [x]intermittent []improving []worsening []no change since onset    Any medication changes, allergies to medications, adverse drug reactions, diagnosis change, or new procedure performed?: [x] No    [] Yes (see summary sheet for update)  Subjective functional status/changes:       Patient is a 75 yo that presents with c/o right shoulder pain and reduced ROM s/p total reverse shoulder replacement performed on 2022. Patient describes pain as sharp, ache. Denies numbness/tingling. Denies popping/clicking. Aggravating factors:rest. Alleviating factors: rest.  Denies red flags: SOB, chest pain, dizziness/lightheadedness, blurred/double vision, HA, chills/fevers, night sweats, change in bowel/bladder control, abdominal pain, difficulty swallowing, slurred speech, unexplained weight gain/loss, nausea, vomiting. PMHx: DM, HTN. Surgical Hx: breast lumpectomy. Social Hx: , single level home, no alcohol, no tobacco, retired. PLOF: walking for exercise, use row machine, clean house CLOF: following rehabilitation protocol.      OBJECTIVE/EXAMINATION    21 min [x]Eval                  []Re-Eval     10 min Therapeutic Exercise:  [x] See flow sheet :   Rationale: increase ROM, increase strength and decrease pain to improve the patients ability to complete ADLs    13 min Manual Therapy:  right shoulder: PROM- flexion to 90 degrees, abduction to 60 degrees, ER in scapular plane    Rationale: decrease pain and increase tissue extensibility to complete ADLs  The manual therapy interventions were performed at a separate and distinct time from the therapeutic activities intervention. With   [x] TE   [] TA   [] neuro   [] other: Patient Education: [x] Review HEP    [] Progressed/Changed HEP based on:   [] positioning   [] body mechanics   [] transfers   [] heat/ice application    [] other:        Physical Therapy Evaluation - Shoulder    Posture: [] Poor    [x] Fair    [] Good    Describe: Forward head and rounded shoulders    ROM:  [] Unable to assess at this time                                           AROM                                                              PROM   Left Right  Left Right   Flexion WNL NT Flexion  90   Extension WNL NT Extension  0   Scaption/ABD WNL NT Scaptin/ABD  60   ER @ 0 Degrees WNL NT ER @ scaption   45   ER @ 90 Degrees WNL NT ER @ 90 Degrees  NT   IR @ 90 Degrees WNL NT IR @ 90 Degrees  NT     End Feel / Painful Arc:    UE Strength:   [] Unable to assess at this time                                                                            L (1-5) R (1-5) Pain   Flexion 4 NT [] Yes   [] No   Abduction 4 NT [] Yes   [] No   ER 4 NT [] Yes   [] No   IR 4+ NT [] Yes   [] No   Extension 4+ NT [] Yes   [] No   Elbow flexion 4+ 3 [] Yes   [] No   Elbow Ext 4+ 3 [] Yes   [] No   Wrist Flexion 4 3 [] Yes   [] No   Wrist Extension 4 4- [] Yes   [] No   Thumb extension 4 4- [] Yes   [] No   Finger Abduction 4 4- [] Yes   [] No       Scapulohumoral Control / Rhythm:  Able to eccentrically lower with good control? Left: [] Yes   [x] No     Right: [] Yes   [x] No    Accessory Motions:    Palpation  [x] Min  [] Mod  [] Severe    Location:right shoulder  [] Min  [] Mod  [] Severe    Location:    Optional Tests:    Sensation Left Right   Biceps (C5) WNL WNL   Macario Radial(C6-7) WNL WNL   Macario Ulnar(C8-T1) WNL WL       Other Tests / Comments:     Surgical wound is healing well no signs of infection.   Required tactile cues to perform scapular retraction  Muscle guarding with manual therapy       Pain Level (0-10 scale) post treatment: 0    ASSESSMENT/Changes in Function:   Patient is a 75 yo that presents with c/o right shoulder pain and reduced ROM s/p total reverse shoulder replacement performed on 12/28/2022. Surgical wound is healing well. She has reduced strength and ROM of the right shoulder. She demonstrates good understanding of surgical precautions. Patient presentation is consistent s/p reverse total shoulder replacement. Patient will  benefit from skilled PT services to modify and progress therapeutic interventions, address functional mobility deficits, address ROM deficits, address strength deficits, analyze and address soft tissue restrictions, analyze and cue movement patterns, analyze and modify body mechanics/ergonomics and assess and modify postural abnormalities to attain her goals.      [x]  See Plan of Care  []  See progress note/recertification  []  See Discharge Summary         Progress towards goals / Updated goals:  See POC    PLAN  []  Upgrade activities as tolerated     [x]  Continue plan of care  []  Update interventions per flow sheet       []  Discharge due to:_  []  Other:_      Yani Black, PT, DPT 1/30/2023  12:58 PM

## 2023-01-30 NOTE — PROGRESS NOTES
[facility] [place] [address] [place] [address]  Phone: [phone]   Fax: [phone]    Plan of Care/ Statement of Necessity for Physical Therapy Services     Patient name: Rebecca East Start of Care: 2023   Referral source: Myesha Miller MD : 1945    Medical Diagnosis: Pain in right shoulder [M25.511]   Onset Date:2022    Treatment Diagnosis: right shoulder pain   Prior Hospitalization: see medical history Provider#: [id number]   Medications: Verified on Patient summary List    Comorbidities: DM, HTN. Surgical Hx: breast lumpectomy   Prior Level of Function: walking for exercise, use row machine, clean house     The Plan of Care and following information is based on the information from the initial evaluation. Assessment/ key information: Patient is a 75 yo that presents with c/o right shoulder pain and reduced ROM s/p total reverse shoulder replacement performed on 2022. Surgical wound is healing well. She has reduced strength and ROM of the right shoulder. She demonstrates good understanding of surgical precautions. Patient presentation is consistent s/p reverse total shoulder replacement. Patient will  benefit from skilled PT services to modify and progress therapeutic interventions, address functional mobility deficits, address ROM deficits, address strength deficits, analyze and address soft tissue restrictions, analyze and cue movement patterns, analyze and modify body mechanics/ergonomics and assess and modify postural abnormalities to attain her goals. Evaluation Complexity History HIGH Complexity :3+ comorbidities / personal factors will impact the outcome/ POC ; Examination LOW Complexity : 1-2 Standardized tests and measures addressing body structure, function, activity limitation and / or participation in recreation  ;Presentation MEDIUM Complexity : Evolving with changing characteristics  ; Clinical Decision Making MEDIUM Complexity : FOTO score of 26-74  Overall Complexity Rating: LOW   Problem List: pain affecting function, decrease ROM, decrease strength, edema affecting function, decrease ADL/ functional abilitiies, decrease activity tolerance, decrease flexibility/ joint mobility, and decrease transfer abilities   Treatment Plan may include any combination of the following: Therapeutic exercise, Neuromuscular reeducation, Manual therapy, Therapeutic activity, Self care/home management, Electric stim unattended , Vasopneumatic device, and Electric stim attended  Patient / Family readiness to learn indicated by: asking questions, trying to perform skills, and interest  Persons(s) to be included in education: patient (P)  Barriers to Learning/Limitations: None  Measures taken if barriers to learning: NA  Patient Goal (s): get strength and use back  Patient Self Reported Health Status: good  Rehabilitation Potential: good    Short Term Goals: To be accomplished in 6 weeks:   Patient will report compliance with HEP at least 1x/day to aid in rehabilitation program.   Status at IE: Provided initial HEP   Current:Same as IE     Patient will display right shoulder pain free AROM 140 degrees into flexion to aid in completion of ADLs. Status at IE: PROM 90 degrees   Current:Same as IE    Long Term Goals: To be accomplished in 12 weeks:   Patient will report compliance with HEP a least 3-4x/week to aid in rehabilitation/strengthening program.   Status at IE: NA   Current:Same as IE     Patient will report no pain greater than 1-2/10 with overhead activities to aid in completion of ADLs. Status at IE: 0-6/10   Current:Same as IE     Patient will increase right UE strength to 4/5 throughout all planes to aid in completion of ADLs. Status at IE: unable to test secondary to rehabilitation protocol   Current:Same as IE     Patient will increase FOTO score to 58 points overall to demonstrate improvement in functional status.     Status at IE: [facility] score = 32 (an established functional score where 100 = no disability)   Current: Same as IE    Frequency / Duration: Patient to be seen 2 times per week for 12 weeks. Patient/ Caregiver education and instruction: Diagnosis, prognosis, self care, activity modification, brace/ splint application, and exercises   [x]  Plan of care has been reviewed with PTA    Certification Period: 1/30/2023 - 4/30/2023    Jessica Hinson PT, DPT 1/30/2023 1:52 PM  _____________________________________________________________________  I certify that the above Therapy Services are being furnished while the patient is under my care. I agree with the treatment plan and certify that this therapy is necessary.     Physician's Signature:____________Date:_________TIME:________                                      Mukul Valencia MD    ** Signature, Date and Time must be completed for valid certification **    Please sign and return to   [facility] at [address]  Phone: [phone]   Fax: [phone]

## 2023-02-02 ENCOUNTER — HOSPITAL ENCOUNTER (OUTPATIENT)
Dept: PHYSICAL THERAPY | Age: 78
Discharge: HOME | End: 2023-02-02
Payer: MEDICARE

## 2023-02-02 PROCEDURE — 97110 THERAPEUTIC EXERCISES: CPT

## 2023-02-02 PROCEDURE — 97140 MANUAL THERAPY 1/> REGIONS: CPT

## 2023-02-02 NOTE — PROGRESS NOTES
PT DAILY TREATMENT NOTE    Patient Name: Rain Anderson  [id number]  : 1945  [x]  Patient  Verified  Payor: Isidro Ellington / Plan: [place] MED[place] & [place] / Product Type: Medicare /    In time: 6980  Out time:   Total Treatment Time (min): 35  Total Timed Codes (min): 35  1:1 Treatment Time ( only): 35   Visit #: 2 of 24    Treatment Dx: Pain in right shoulder [M25.511]    SUBJECTIVE  Pain Level (0-10 scale): 0  Any medication changes, allergies to medications, adverse drug reactions, diagnosis change, or new procedure performed?: [x] No    [] Yes (see summary sheet for update)  Subjective functional status/changes:   [] No changes reported  \"I only have pain when I move it the wrong way. No pain at all most of the time. \"    OBJECTIVE    20 min Therapeutic Exercise:  [x] See flow sheet :   Rationale: increase ROM, increase strength and decrease pain to improve the patients ability to complete ADLs  ambulation safety and efficiency in order to improve patient's ability to safely ambulate at home for self care. 15 min Manual Therapy:  Gentle PROM in flexion, abduction and ER to limits defined by protocol. Rationale: decrease pain and increase ROM to improve the patients ability to complete ADLS  The Manual Therapy interventions were performed at a separate and distinct time from the Therapeutic Activities interventions. With   [] TE   [] TA   [] neuro   [] other: Patient Education: [x] Review HEP    [] Progressed/Changed HEP based on:   [] positioning   [] body mechanics   [] transfers   [] heat/ice application    [] other:      Other Objective/Functional Measures: NA     Pain Level (0-10 scale) post treatment: 0    ASSESSMENT/Changes in Function: Patient arrived with copy of surgeon post op protocol. Added gentle wand exercises to program per protocol with good tolerance. Patient provided written copy of updates to HEP. Patient responds well to treatment session.   No adverse effects were noted from today's treatment session. Patient will continue to benefit from skilled PT services to modify and progress therapeutic interventions, address functional mobility deficits, address ROM deficits, address strength deficits, analyze and address soft tissue restrictions, analyze and cue movement patterns, analyze and modify body mechanics/ergonomics, assess and modify postural abnormalities,  and instruct in home and community integration to attain remaining goals. [x]  See Plan of Care  []  See progress note/recertification  []  See Discharge Summary         Progress towards goals / Updated goals:  Short Term Goals: To be accomplished in 6 weeks:                 Patient will report compliance with HEP at least 1x/day to aid in rehabilitation program.                 Status at IE: Provided initial HEP                 Current: In-progress, patient reports good early compliance with initial HEP.    2/2/2023                    Patient will display right shoulder pain free AROM 140 degrees into flexion to aid in completion of ADLs. Status at IE: PROM 90 degrees                 Current:Same as IE     Long Term Goals: To be accomplished in 12 weeks:                 Patient will report compliance with HEP a least 3-4x/week to aid in rehabilitation/strengthening program.                 Status at IE: NA                 Current:Same as IE                    Patient will report no pain greater than 1-2/10 with overhead activities to aid in completion of ADLs. Status at IE: 0-6/10                 Current:Same as IE                    Patient will increase right UE strength to 4/5 throughout all planes to aid in completion of ADLs. Status at IE: unable to test secondary to rehabilitation protocol                 Current:Same as IE                    Patient will increase FOTO score to 58 points overall to demonstrate improvement in functional status. Status at IE: FOTO score = 32 (an established functional score where 100 = no disability)                 Current: Same as IE    PLAN  []  Upgrade activities as tolerated     [x]  Continue plan of care  []  Update interventions per flow sheet       []  Discharge due to:_  []  Other:_      Jt Kohler, PT 2/2/2023  3:44 PM    Future Appointments   Date Time Provider Eder Bustillos   2/7/2023 12:30 PM Charlotte Guerra, PT [facility]   2/9/2023 12:00 PM Charlotte Guerra, PT [facility]

## 2023-02-07 ENCOUNTER — HOSPITAL ENCOUNTER (OUTPATIENT)
Dept: PHYSICAL THERAPY | Age: 78
Discharge: HOME | End: 2023-02-07
Payer: MEDICARE

## 2023-02-07 PROCEDURE — 97110 THERAPEUTIC EXERCISES: CPT

## 2023-02-07 PROCEDURE — 97112 NEUROMUSCULAR REEDUCATION: CPT

## 2023-02-07 NOTE — PROGRESS NOTES
PT DAILY TREATMENT NOTE - [facility]     Patient Name: Eugenie Neff  VWBH:[id number]  : 1945  [x]  Patient  Verified  Payor: [place] MEDICARE / Plan: [place] MEDICARE PART A & B / Product Type: Medicare /    In time:1230  Out time:1300  Total Treatment Time (min): 30  Total Timed Codes (min): 30   1:1 Treatment Time ( [address] only): 30   Visit #: 3 of 24    Treatment Area: Pain in right shoulder [M25.511]    SUBJECTIVE  Pain Level (0-10 scale): 0  Any medication changes, allergies to medications, adverse drug reactions, diagnosis change, or new procedure performed?: [x] No    [] Yes (see summary sheet for update)  Subjective functional status/changes:   [] No changes reported  Patient reports no new changes. OBJECTIVE    20 min Therapeutic Exercise:  [x] See flow sheet :   Rationale: increase ROM, increase strength and decrease pain to improve the patients ability to complete ADLs     10 min Neuromuscular Re-education:  [x]  See flow sheet :   Rationale: improve coordination, improve balance and increase proprioception  to improve the patients ability to complete ADLs          With   [x] TE   [] TA   [] neuro   [] other: Patient Education: [x] Review HEP    [] Progressed/Changed HEP based on:   [] positioning   [] body mechanics   [] transfers   [] heat/ice application    [] other:      Other Objective/Functional Measures: NA     Pain Level (0-10 scale) post treatment: 0    ASSESSMENT/Changes in Function: Patient responds well to treatment session. She required cues to recall exercise parameters. Held manual therapy as patient guarded heavily no permitting ROM. Will continue to advance exercise per rehabilitation protocol.   No adverse effects were noted from today's treatment session    Patient will continue to benefit from skilled PT services to modify and progress therapeutic interventions, address functional mobility deficits, address ROM deficits, address strength deficits, analyze and address soft tissue restrictions, analyze and cue movement patterns, analyze and modify body mechanics/ergonomics, assess and modify postural abnormalities, and instruct in home and community integration to attain remaining goals. []  See Plan of Care  []  See progress note/recertification  []  See Discharge Summary         Progress towards goals / Updated goals:  Short Term Goals: To be accomplished in 6 weeks:                 Patient will report compliance with HEP at least 1x/day to aid in rehabilitation program.                 Status at IE: Provided initial HEP                 Current: In-progress, patient reports good early compliance with initial HEP.    2/2/2023                    Patient will display right shoulder pain free AROM 140 degrees into flexion to aid in completion of ADLs. Status at IE: PROM 90 degrees                 Current: In-progress, PROM 100 degrees, 2/7/2023     Long Term Goals: To be accomplished in 12 weeks:                 Patient will report compliance with HEP a least 3-4x/week to aid in rehabilitation/strengthening program.                 Status at IE: NA                 Current:Same as IE                    Patient will report no pain greater than 1-2/10 with overhead activities to aid in completion of ADLs. Status at IE: 0-6/10                 Current:Same as IE                    Patient will increase right UE strength to 4/5 throughout all planes to aid in completion of ADLs. Status at IE: unable to test secondary to rehabilitation protocol                 Current:Same as IE                    Patient will increase FOTO score to 58 points overall to demonstrate improvement in functional status.                   Status at IE: FOTO score = 32 (an established functional score where 100 = no disability)                 Current: Same as IE    PLAN  []  Upgrade activities as tolerated     [x]  Continue plan of care  []  Update interventions per flow sheet       []  Discharge due to:_  []  Other:_      Scottie Gutierrez, PT, DPT 2/7/2023  12:54 PM    Future Appointments   Date Time Provider Eder Bustillos   2/9/2023 12:00 PM Sanju Hutchinson, PT [facility]

## 2023-02-09 ENCOUNTER — HOSPITAL ENCOUNTER (OUTPATIENT)
Dept: PHYSICAL THERAPY | Age: 78
Discharge: HOME | End: 2023-02-09
Payer: MEDICARE

## 2023-02-09 PROCEDURE — 97112 NEUROMUSCULAR REEDUCATION: CPT

## 2023-02-09 PROCEDURE — 97110 THERAPEUTIC EXERCISES: CPT

## 2023-02-09 NOTE — PROGRESS NOTES
PT DAILY TREATMENT NOTE - [facility]     Patient Name: Toni Vogel  BVYH:1/3/2097  : 1945  [x]  Patient  Verified  Payor: [place] MEDICARE / Plan: [place] MEDICARE PART A & B / Product Type: Medicare /    In time:1200  Out time:1225  Total Treatment Time (min): 25  Total Timed Codes (min): 25   1:1 Treatment Time ( [address] only): 25   Visit #: 3 of 24    Treatment Area: Pain in right shoulder [M25.511]    SUBJECTIVE  Pain Level (0-10 scale): 0-1  Any medication changes, allergies to medications, adverse drug reactions, diagnosis change, or new procedure performed?: [x] No    [] Yes (see summary sheet for update)  Subjective functional status/changes:   [] No changes reported  Patient reports that she likes to be able to control the ROM with the wand. OBJECTIVE    17 min Therapeutic Exercise:  [x] See flow sheet :   Rationale: increase ROM, increase strength and decrease pain to improve the patients ability to complete ADLs     8 min Neuromuscular Re-education:  [x]  See flow sheet :   Rationale: improve coordination, improve balance and increase proprioception  to improve the patients ability to complete ADLs          With   [x] TE   [] TA   [] neuro   [] other: Patient Education: [x] Review HEP    [] Progressed/Changed HEP based on:   [] positioning   [] body mechanics   [] transfers   [] heat/ice application    [] other:      Other Objective/Functional Measures: NA     Pain Level (0-10 scale) post treatment: 0    ASSESSMENT/Changes in Function: Patient responds well to treatment session. Advanced exercise by introducing isometric abduction and external rotation. Patient was challenged with exercise prescribed. Will advance exercise in accordance with rehabilitation protocol.  No adverse effects were noted from today's treatment session      Patient will continue to benefit from skilled PT services to modify and progress therapeutic interventions, address functional mobility deficits, address ROM deficits, address strength deficits, analyze and address soft tissue restrictions, analyze and cue movement patterns, analyze and modify body mechanics/ergonomics, assess and modify postural abnormalities, address imbalance and instruct in home and community integration to attain remaining goals. []  See Plan of Care  []  See progress note/recertification  []  See Discharge Summary         Progress towards goals / Updated goals:  Short Term Goals: To be accomplished in 6 weeks:                 Patient will report compliance with HEP at least 1x/day to aid in rehabilitation program.                 Status at IE: Provided initial HEP                 Current: In-progress, patient reports good early compliance with initial HEP.    2/2/2023                    Patient will display right shoulder pain free AROM 140 degrees into flexion to aid in completion of ADLs. Status at IE: PROM 90 degrees                 Current: In-progress, PROM 100 degrees, 2/7/2023     Long Term Goals: To be accomplished in 12 weeks:                 Patient will report compliance with HEP a least 3-4x/week to aid in rehabilitation/strengthening program.                 Status at IE: NA                 Current:Same as IE                    Patient will report no pain greater than 1-2/10 with overhead activities to aid in completion of ADLs. Status at IE: 0-6/10                 Current:Same as IE                    Patient will increase right UE strength to 4/5 throughout all planes to aid in completion of ADLs. Status at IE: unable to test secondary to rehabilitation protocol                 Current:Same as IE                    Patient will increase FOTO score to 58 points overall to demonstrate improvement in functional status.                   Status at IE: FOTO score = 32 (an established functional score where 100 = no disability)                 Current: Same as IE    PLAN  []  Upgrade activities as tolerated [x]  Continue plan of care  []  Update interventions per flow sheet       []  Discharge due to:_  []  Other:_      Brandon Nazario PT, DPT 2/9/2023  12:10 PM    No future appointments.

## 2023-02-16 ENCOUNTER — HOSPITAL ENCOUNTER (OUTPATIENT)
Facility: HOSPITAL | Age: 78
Setting detail: RECURRING SERIES
Discharge: HOME | End: 2023-02-19
Payer: MEDICARE

## 2023-02-16 PROCEDURE — 97112 NEUROMUSCULAR REEDUCATION: CPT

## 2023-02-16 PROCEDURE — 97110 THERAPEUTIC EXERCISES: CPT

## 2023-02-16 NOTE — PROGRESS NOTES
PHYSICAL / OCCUPATIONAL THERAPY - DAILY TREATMENT NOTE (updated )    Patient Name: Charly Butler    Date: 2023    : 1945  Insurance: Payor: MEDICARE / Plan: MEDICARE PART A AND B / Product Type: *No Product type* /      Patient  verified Yes     Visit #   Current / Total 5 24   Time   In / Out 1505 1535   Pain   In / Out 0 0   Subjective Functional Status/Changes: Patient denies pain and feels that she is progressing well. Changes to:  Meds, Allergies, Med Hx, Sx Hx? If yes, update Summary List no       TREATMENT AREA =  Pain in right shoulder [M25.511]    OBJECTIVE    Therapeutic Procedures: Tx Min Billable or 1:1 Min (if diff from Tx Min) Procedure, Rationale, Specifics   20  64107 Therapeutic Exercise (timed):  increase ROM, strength, coordination, balance, and proprioception to improve patient's ability to progress to PLOF and address remaining functional goals. (see flow sheet as applicable)     Details if applicable:       10  65190 Neuromuscular Re-Education (timed):  improve balance, coordination, kinesthetic sense, posture, core stability and proprioception to improve patient's ability to develop conscious control of individual muscles and awareness of position of extremities in order to progress to PLOF and address remaining functional goals. (see flow sheet as applicable)     Details if applicable:     27  [facility] Totals Reminder: bill using total billable min of TIMED therapeutic procedures (example: do not include dry needle or estim unattended, both untimed codes, in totals to left)  8-22 min = 1 unit; 23-37 min = 2 units; 38-52 min = 3 units; 53-67 min = 4 units; 68-82 min = 5 units   Total Total     [x]  Patient Education billed concurrently with other procedures   [x] Review HEP    [] Progressed/Changed HEP, detail:    [] Other detail:       Objective Information/Functional Measures/Assessment    Advanced exercise by introducing AROM flexion and scaption.  Provided mirror for visual feedback. She was challenged with exercise program. Will continue to advance exercise in accordance with rehabilitation protocol. Patient will continue to benefit from skilled PT / OT services to modify and progress therapeutic interventions, analyze and address functional mobility deficits, analyze and address ROM deficits, analyze and address strength deficits, analyze and address soft tissue restrictions, analyze and cue for proper movement patterns, analyze and modify for postural abnormalities, and instruct in home and community integration to address functional deficits and attain remaining goals. Progress toward goals / Updated goals:  []  See Progress Note/Recertification    Short Term Goals: To be accomplished in 6 weeks:                 Patient will report compliance with HEP at least 1x/day to aid in rehabilitation program.                 Status at IE: Provided initial HEP                 Current: In-progress, patient reports good early compliance with initial HEP.    2/2/2023                    Patient will display right shoulder pain free AROM 140 degrees into flexion to aid in completion of ADLs. Status at IE: PROM 90 degrees                 Current: In-progress, AROM 120 degrees, 2/16/2023     Long Term Goals: To be accomplished in 12 weeks:                 Patient will report compliance with HEP a least 3-4x/week to aid in rehabilitation/strengthening program.                 Status at IE: NA                 Current:Same as IE                    Patient will report no pain greater than 1-2/10 with overhead activities to aid in completion of ADLs. Status at IE: 0-6/10                 Current:Same as IE                    Patient will increase right UE strength to 4/5 throughout all planes to aid in completion of ADLs.                  Status at IE: unable to test secondary to rehabilitation protocol                 Current:Same as IE Patient will increase FOTO score to 58 points overall to demonstrate improvement in functional status.                   Status at IE: FOTO score = 32 (an established functional score where 100 = no disability)                 Current: Same as IE    PLAN  Yes  Continue plan of care  []  Upgrade activities as tolerated  []  Discharge due to :  []  Other:    Elena Stein, PT    2/16/2023    2:33 PM    Future Appointments   Date Time Provider Pierce Martinez   2/16/2023  3:00 PM Hector Batista [facility]   2/22/2023  3:00 PM Chadwick Nim, PT [facility]   2/24/2023  3:00 PM Chadwick Nim, PT [facility]   2/27/2023 10:00 AM [facility] PT [facility]   3/2/2023 11:00 AM Bao Bogus, PT [facility]   3/7/2023 11:00 AM Bao Bogus, PT [facility]   3/9/2023 11:00 AM Bao Bogus, PT [facility]   3/14/2023 11:00 AM Bao Bogus, PT [facility]   3/16/2023 11:00 AM Bao Bogus, PT [facility]   3/21/2023 11:00 AM Bao Bogus, PT [facility]   3/23/2023 11:00 AM Bao Bogus, PT [facility]   3/28/2023 11:00 AM Bao Bogus, PT [facility]   3/30/2023 11:00 AM Carolyn Barry [facility]

## 2023-02-22 ENCOUNTER — APPOINTMENT (OUTPATIENT)
Facility: HOSPITAL | Age: 78
End: 2023-02-22
Payer: MEDICARE

## 2023-02-23 ENCOUNTER — HOSPITAL ENCOUNTER (OUTPATIENT)
Facility: HOSPITAL | Age: 78
Setting detail: RECURRING SERIES
Discharge: HOME | End: 2023-02-26
Payer: MEDICARE

## 2023-02-23 PROCEDURE — 97112 NEUROMUSCULAR REEDUCATION: CPT

## 2023-02-23 PROCEDURE — 97110 THERAPEUTIC EXERCISES: CPT

## 2023-02-23 PROCEDURE — 97530 THERAPEUTIC ACTIVITIES: CPT

## 2023-02-23 NOTE — PROGRESS NOTES
PHYSICAL / OCCUPATIONAL THERAPY - DAILY TREATMENT NOTE (updated )    Patient Name: Gordo Rockwell    Date: 2023    : 1945  Insurance: Payor: MEDICARE / Plan: MEDICARE PART A AND B / Product Type: *No Product type* /      Patient  verified Yes     Visit #   Current / Total 6 24   Time   In / Out 1056 1139   Pain   In / Out 3 0   Subjective Functional Status/Changes: \"I am in more pain today. It started in the morning and I am not sure what caused it. \"   Changes to:  Meds, Allergies, Med Hx, Sx Hx? If yes, update Summary List no       TREATMENT AREA =  Pain in right shoulder [M25.511]    OBJECTIVE    Therapeutic Procedures: Tx Min Billable or 1:1 Min (if diff from Tx Min) Procedure, Rationale, Specifics   18 15 [id number] Therapeutic Exercise (timed):  increase ROM, strength, coordination, balance, and proprioception to improve patient's ability to progress to PLOF and address remaining functional goals. (see flow sheet as applicable)     Details if applicable:       15  12935 Therapeutic Activity (timed):  use of dynamic activities replicating functional movements to increase ROM, strength, coordination, balance, and proprioception in order to improve patient's ability to progress to PLOF and address remaining functional goals. (see flow sheet as applicable)     Details if applicable:     10  41098 Neuromuscular Re-Education (timed):  improve balance, coordination, kinesthetic sense, posture, core stability and proprioception to improve patient's ability to develop conscious control of individual muscles and awareness of position of extremities in order to progress to PLOF and address remaining functional goals.  (see flow sheet as applicable)     Details if applicable:            Details if applicable:            Details if applicable:     37 40 [facility] Totals Reminder: bill using total billable min of TIMED therapeutic procedures (example: do not include dry needle or estim unattended, both untimed codes, in totals to left)  8-22 min = 1 unit; 23-37 min = 2 units; 38-52 min = 3 units; 53-67 min = 4 units; 68-82 min = 5 units   Total Total     [x]  Patient Education billed concurrently with other procedures   [x] Review HEP    [] Progressed/Changed HEP, detail:    [] Other detail:       Objective Information/Functional Measures/Assessment    Patient reporting exacerbation of pain symptoms of unidentified origin. On discussing possible causes of increased pain, patient did respond that she may have rolled onto right side while sleeping and did awake with increased pain. Educated patient on positioning with additional pillows in bed to prevent rolling onto right side while sleeping. Patient will continue to benefit from skilled PT / OT services to modify and progress therapeutic interventions, analyze and address functional mobility deficits, analyze and address ROM deficits, analyze and address strength deficits, analyze and address soft tissue restrictions, analyze and cue for proper movement patterns, analyze and modify for postural abnormalities, analyze and address imbalance/dizziness, and instruct in home and community integration to address functional deficits and attain remaining goals. Progress toward goals / Updated goals:  []  See Progress Note/Recertification    Short Term Goals: To be accomplished in 6 weeks:                 Patient will report compliance with HEP at least 1x/day to aid in rehabilitation program.                 Status at IE: Provided initial HEP                 Current: In-progress, patient reports good early compliance with initial HEP.    2/2/2023                    Patient will display right shoulder pain free AROM 140 degrees into flexion to aid in completion of ADLs. Status at IE: PROM 90 degrees                 Current: In-progress, AROM 120 degrees, 2/16/2023     Long Term Goals:  To be accomplished in 12 weeks:                 Patient will report compliance with HEP a least 3-4x/week to aid in rehabilitation/strengthening program.                 Status at IE: NA                 Current:Same as IE                    Patient will report no pain greater than 1-2/10 with overhead activities to aid in completion of ADLs. Status at IE: 0-6/10                 Current: Pain currently in 0-3/10 range. In-progress, 2/23/2023                    Patient will increase right UE strength to 4/5 throughout all planes to aid in completion of ADLs. Status at IE: unable to test secondary to rehabilitation protocol                 Current:Same as IE                    Patient will increase FOTO score to 58 points overall to demonstrate improvement in functional status.                   Status at IE: FOTO score = 32 (an established functional score where 100 = no disability)                 Current: Same as IE    PLAN  Yes  Continue plan of care  []  Upgrade activities as tolerated  []  Discharge due to :  []  Other:    Saeed Hart PT    2/23/2023    11:02 AM    Future Appointments   Date Time Provider Pierce Martinez   2/27/2023 10:00 AM David [facility]   3/2/2023 11:00 AM Dunia Carrington [facility]   3/7/2023 11:00 AM Jass Wang, PT [facility]   3/9/2023 11:00 AM Jass Pelletierg, PT [facility]   3/14/2023 11:00 AM Jass Pelletierg, PT [facility]   3/16/2023 11:00 AM Jass Pelletierg, PT [facility]   3/21/2023 11:00 AM Jass Pelletierg, PT [facility]   3/23/2023 11:00 AM Jass Pelletierg, PT [facility]   3/28/2023 11:00 AM Jass Pelletierg, PT [facility]   3/30/2023 11:00 AM Janet Yeh [facility]

## 2023-02-24 ENCOUNTER — APPOINTMENT (OUTPATIENT)
Facility: HOSPITAL | Age: 78
End: 2023-02-24
Payer: MEDICARE

## 2023-02-27 ENCOUNTER — HOSPITAL ENCOUNTER (OUTPATIENT)
Facility: HOSPITAL | Age: 78
Setting detail: RECURRING SERIES
End: 2023-02-27
Payer: MEDICARE

## 2023-02-28 ENCOUNTER — HOSPITAL ENCOUNTER (OUTPATIENT)
Facility: HOSPITAL | Age: 78
Setting detail: RECURRING SERIES
Discharge: HOME | End: 2023-03-03
Payer: MEDICARE

## 2023-02-28 PROCEDURE — 97110 THERAPEUTIC EXERCISES: CPT

## 2023-02-28 PROCEDURE — 97530 THERAPEUTIC ACTIVITIES: CPT

## 2023-02-28 NOTE — PROGRESS NOTES
PHYSICAL / OCCUPATIONAL THERAPY - DAILY TREATMENT NOTE (updated )    Patient Name: Astrid Kramer    Date: 2023    : 1945  Insurance: Payor: MEDICARE / Plan: MEDICARE PART A AND B / Product Type: *No Product type* /      Patient  verified Yes     Visit #   Current / Total 7 24   Time   In / Out 1230 1300   Pain   In / Out 0/10 0/10   Subjective Functional Status/Changes: No reports of pain today    Changes to:  Meds, Allergies, Med Hx, Sx Hx? If yes, update Summary List no       TREATMENT AREA =  Pain in right shoulder [M25.511]    OBJECTIVE         Therapeutic Procedures: Tx Min Billable or 1:1 Min (if diff from Tx Min) Procedure, Rationale, Specifics   10  20806 Therapeutic Exercise (timed):  increase ROM, strength, coordination, balance, and proprioception to improve patient's ability to progress to PLOF and address remaining functional goals. (see flow sheet as applicable)     Details if applicable:       20  15806 Therapeutic Activity (timed):  use of dynamic activities replicating functional movements to increase ROM, strength, coordination, balance, and proprioception in order to improve patient's ability to progress to PLOF and address remaining functional goals. (see flow sheet as applicable)     Details if applicable:            Details if applicable:            Details if applicable:            Details if applicable:     27  [facility] Totals Reminder: bill using total billable min of TIMED therapeutic procedures (example: do not include dry needle or estim unattended, both untimed codes, in totals to left)  8-22 min = 1 unit; 23-37 min = 2 units; 38-52 min = 3 units; 53-67 min = 4 units; 68-82 min = 5 units   Total Total     [x]  Patient Education billed concurrently with other procedures   [x] Review HEP    [] Progressed/Changed HEP, detail:    [] Other detail:       Objective Information/Functional Measures/Assessment    Patient tolerated treatment session well today.  Patient had no complaints with addition increased reps for wrist ext/flx to exercise program to accomplish increased functional UE strength.  Patient continues to make steady progress toward goals and would benefit from continued skilled PT intervention to address remaining deficits outlined in goals below.      Patient will continue to benefit from skilled PT / OT services to modify and progress therapeutic interventions, analyze and address functional mobility deficits, analyze and address ROM deficits, analyze and address strength deficits, analyze and address soft tissue restrictions, analyze and cue for proper movement patterns, and analyze and modify for postural abnormalities to address functional deficits and attain remaining goals.    Progress toward goals / Updated goals:  []  See Progress Note/Recertification       Short Term Goals: To be accomplished in 6 weeks:                 Patient will report compliance with HEP at least 1x/day to aid in rehabilitation program.                 Status at IE: Provided initial HEP                 Current: In-progress, patient reports good early compliance with initial HEP.    2/2/2023                    Patient will display right shoulder pain free AROM 140 degrees into flexion to aid in completion of ADLs.                 Status at IE: PROM 90 degrees                 Current:In-progress, AROM 120 degrees, 2/16/2023     Long Term Goals: To be accomplished in 12 weeks:                 Patient will report compliance with HEP a least 3-4x/week to aid in rehabilitation/strengthening program.                 Status at IE: NA                 Current:Same as IE                    Patient will report no pain greater than 1-2/10 with overhead activities to aid in completion of ADLs.                 Status at IE: 0-6/10                 Current: Pain currently in 0-3/10 range.    In-progress, 2/23/2023                    Patient will increase right UE strength to 4/5 throughout all planes to  aid in completion of ADLs. Status at IE: unable to test secondary to rehabilitation protocol                 Current:Same as IE                    Patient will increase FOTO score to 58 points overall to demonstrate improvement in functional status.                   Status at IE: FOTO score = 32 (an established functional score where 100 = no disability)                 Current: Same as IE    PLAN  Yes  Continue plan of care  []  Upgrade activities as tolerated  []  Discharge due to :  []  Other:    Addis Carrilloland, [facility]    2/28/2023    12:33 PM    Future Appointments   Date Time Provider Pierce Martinez   3/2/2023 11:00 AM Mare SheilatDunia [facility]   3/7/2023 11:00 AM Mare Collet, [place] [facility] [place] [facility]   3/9/2023 11:00 AM Mare Collet, PT [facility]   3/14/2023 11:00 AM Mare Collet, [place] [facility]   3/16/2023 11:00 AM Mare Collet, PT [facility]   3/21/2023 11:00 AM Mare Collet, PT [facility]   3/23/2023 11:00 AM Mare Collet, PT [facility]   3/28/2023 11:00 AM Mare Collet, [place] [facility] [place] [facility]   3/30/2023 11:00 AM Maicol Rosario [facility]

## 2023-03-02 ENCOUNTER — HOSPITAL ENCOUNTER (OUTPATIENT)
Facility: HOSPITAL | Age: 78
Setting detail: RECURRING SERIES
Discharge: HOME | End: 2023-03-05
Payer: MEDICARE

## 2023-03-02 PROCEDURE — 97110 THERAPEUTIC EXERCISES: CPT

## 2023-03-02 PROCEDURE — 97530 THERAPEUTIC ACTIVITIES: CPT

## 2023-03-02 PROCEDURE — 97112 NEUROMUSCULAR REEDUCATION: CPT

## 2023-03-02 NOTE — PROGRESS NOTES
PHYSICAL / OCCUPATIONAL THERAPY - DAILY TREATMENT NOTE (updated )    Patient Name: Adelina King    Date: 3/2/2023    : 1945  Insurance: Payor: MEDICARE / Plan: MEDICARE PART A AND B / Product Type: *No Product type* /      Patient  verified Yes   Visit #   Current / Total 8 24   Time   In / Out 1208 1248   Pain   In / Out 0 0   Subjective Functional Status/Changes: Patient reports that she has no pain. She reports compliance with HEP. She is happy with progress from surgery so far. Changes to:  Meds, Allergies, Med Hx, Sx Hx? If yes, update Summary List no       TREATMENT AREA =  Pain in right shoulder [M25.511]    OBJECTIVE    Therapeutic Procedures: Tx Min Billable or 1:1 Min (if diff from Tx Min) Procedure, Rationale, Specifics   20  25053 Therapeutic Exercise (timed):  increase ROM, strength, coordination, balance, and proprioception to improve patient's ability to progress to PLOF and address remaining functional goals. (see flow sheet as applicable)     Details if applicable:       10  45213 Therapeutic Activity (timed):  use of dynamic activities replicating functional movements to increase ROM, strength, coordination, balance, and proprioception in order to improve patient's ability to progress to PLOF and address remaining functional goals. (see flow sheet as applicable)     Details if applicable:     10  42869 Neuromuscular Re-Education (timed):  improve balance, coordination, kinesthetic sense, posture, core stability and proprioception to improve patient's ability to develop conscious control of individual muscles and awareness of position of extremities in order to progress to PLOF and address remaining functional goals.  (see flow sheet as applicable)     Details if applicable:     36  [facility] Totals Reminder: bill using total billable min of TIMED therapeutic procedures (example: do not include dry needle or estim unattended, both untimed codes, in totals to left)  8-22 min = 1 unit; 23-37 min = 2 units; 38-52 min = 3 units; 53-67 min = 4 units; 68-82 min = 5 units   Total Total     [x]  Patient Education billed concurrently with other procedures   [x] Review HEP    [] Progressed/Changed HEP, detail:    [] Other detail:       Objective Information/Functional Measures/Assessment    Patient is a 77 yo female s/p right total reverse shoulder replacement. Patient is improving as she has met 1/2 STGs and is progressing toward her remaining goals. She is developing strength and functional mobility but is still limited. She is compliant with initial HEP. She has transitioned from PROM to AROM and will continue to progress in accordance with rehabilitation protocol. Patient will continue to benefit from skilled PT /services to modify and progress therapeutic interventions, analyze and address functional mobility deficits, analyze and address ROM deficits, analyze and address strength deficits, analyze and address soft tissue restrictions, analyze and cue for proper movement patterns, analyze and modify for postural abnormalities, and instruct in home and community integration to address functional deficits and attain remaining goals. Progress toward goals / Updated goals:  []  See Progress Note/Recertification    Short Term Goals: To be accomplished in 6 weeks:                 Patient will report compliance with HEP at least 1x/day to aid in rehabilitation program.                 Status at IE: Provided initial HEP                 Current: Met, 3/2/2023                    Patient will display right shoulder pain free AROM 140 degrees into flexion to aid in completion of ADLs. Status at IE: PROM 90 degrees                 Current: In-progress, AROM 132 degrees, 3/2/2023     Long Term Goals:  To be accomplished in 12 weeks:                 Patient will report compliance with HEP a least 3-4x/week to aid in rehabilitation/strengthening program.                 Status at IE: NA Current:Same as IE                    Patient will report no pain greater than 1-2/10 with overhead activities to aid in completion of ADLs. Status at IE: 0-6/10                 Current: Pain currently in 0-3/10 range. In-progress, 2/23/2023                    Patient will increase right UE strength to 4/5 throughout all planes to aid in completion of ADLs. Status at IE: unable to test secondary to rehabilitation protocol                 Current: In-progress, 3+/5, 3/2/2023                   Patient will increase FOTO score to 58 points overall to demonstrate improvement in functional status.                   Status at IE: FOTO score = 32 (an established functional score where 100 = no disability)                 Current: Same as IE    PLAN  Yes  Continue plan of care  []  Upgrade activities as tolerated  []  Discharge due to :  []  Other:    Teri Smith PT    3/2/2023    12:16 PM    Future Appointments   Date Time Provider Pierce Martinez   3/7/2023 11:00 AM Shauna Jacques PT [facility]   3/9/2023 11:00 AM Dunia Brooks [facility]   3/14/2023 11:00 AM Shauna Jacques PT [facility]   3/16/2023 11:00 AM Shauna Jacques PT [facility]   3/21/2023 11:00 AM Shauna Jacques PT [facility]   3/23/2023 11:00 AM Shauna Jacques PT [facility]   3/28/2023 11:00 AM Shauna Jacques PT [facility]   3/30/2023 11:00 AM Winifred Shen [facility]

## 2023-03-02 NOTE — PROGRESS NOTES
[facility] at [address] [place] [address] Drive: [phone]   Fax: [phone]  Progress Note  Patient Name: Amarilis Dolan : 1945   Medical   Diagnosis: Pain in right shoulder [M25.511] Treatment Diagnosis: Right shoulder pain   Onset Date: 2022     Referral Source: Mathew Levine MD Start of Care [facility]): 2023   Prior Hospitalization: See medical history Provider #: [id number]   Prior Level of Function: walking for exercise, use row machine, clean house    Comorbidities: DM, HTN. Surgical Hx: breast lumpectomy   Medications: Verified on Patient Summary List      ===========================================================================================  Assessment / Summary of Care: Amarilis Dolan is a 76 y.o. female s/p right total reverse shoulder replacement. Patient is improving as she has met 1/2 STGs and is progressing toward her remaining goals. She is developing strength and functional mobility but is still limited. She is compliant with initial HEP. She has transitioned from PROM to AROM and will continue to progress in accordance with rehabilitation protocol. Patient will continue to benefit from skilled PT /services to modify and progress therapeutic interventions, analyze and address functional mobility deficits, analyze and address ROM deficits, analyze and address strength deficits, analyze and address soft tissue restrictions, analyze and cue for proper movement patterns, analyze and modify for postural abnormalities, and instruct in home and community integration to address functional deficits and attain remaining goals.    ===========================================================================================    Plan:Continue therapy per initial plan/protocol at a frequency of  2 x per week for 6 weeks    Goals:      Short Term Goals:  To be accomplished in 6 weeks:                 Patient will report compliance with HEP at least 1x/day to aid in rehabilitation program.                 Status at IE: Provided initial HEP                 Current: Met, 3/2/2023                    Patient will display right shoulder pain free AROM 140 degrees into flexion to aid in completion of ADLs. Status at IE: PROM 90 degrees                 Current: In-progress, AROM 132 degrees, 3/2/2023     Long Term Goals: To be accomplished in 12 weeks:                 Patient will report compliance with HEP a least 3-4x/week to aid in rehabilitation/strengthening program.                 Status at IE: NA                 Current:Same as IE                    Patient will report no pain greater than 1-2/10 with overhead activities to aid in completion of ADLs. Status at IE: 0-6/10                 Current: Pain currently in 0-3/10 range. In-progress, 2/23/2023                    Patient will increase right UE strength to 4/5 throughout all planes to aid in completion of ADLs. Status at IE: unable to test secondary to rehabilitation protocol                 Current: In-progress, 3+/5, 3/2/2023                    Patient will increase FOTO score to 58 points overall to demonstrate improvement in functional status. Status at IE: FOTO score = 32 (an established functional score where 100 = no disability)                 Current: Same as IE       ===========================================================================================  Subjective: Patient reports that she has no pain. She reports compliance with HEP.  She is happy with progress from surgery so far      Objective:   ROM right shoulder flexion AROM 132 degrees  MMT 3+/5    Therapist Signature: Leanne Pitts PT, DPT Date: 7/3/3517   Re-Certification: NA Time: 4:21 PM           [facility] at [address]  Phone: [phone]   Fax: [phone]

## 2023-03-07 ENCOUNTER — HOSPITAL ENCOUNTER (OUTPATIENT)
Facility: HOSPITAL | Age: 78
Setting detail: RECURRING SERIES
Discharge: HOME | End: 2023-03-10
Payer: MEDICARE

## 2023-03-07 PROCEDURE — 97110 THERAPEUTIC EXERCISES: CPT

## 2023-03-07 PROCEDURE — 97530 THERAPEUTIC ACTIVITIES: CPT

## 2023-03-07 NOTE — PROGRESS NOTES
PHYSICAL / OCCUPATIONAL THERAPY - DAILY TREATMENT NOTE (updated )    Patient Name: Kiana Pena    Date: 3/7/2023    : 1945  Insurance: Payor: MEDICARE / Plan: MEDICARE PART A AND B / Product Type: *No Product type* /      Patient  verified Yes   Visit #   Current / Total 9 24   Time   In / Out 1058 1133   Pain   In / Out 0 0   Subjective Functional Status/Changes: Patient reports that she is feeling stronger but still has improvement to make. Changes to:  Meds, Allergies, Med Hx, Sx Hx? If yes, update Summary List no       TREATMENT AREA =  Pain in right shoulder [M25.511]    OBJECTIVE  Therapeutic Procedures: Tx Min Billable or 1:1 Min (if diff from Tx Min) Procedure, Rationale, Specifics   20 20 20162 Therapeutic Exercise (timed):  increase ROM, strength, coordination, balance, and proprioception to improve patient's ability to progress to PLOF and address remaining functional goals. (see flow sheet as applicable)     Details if applicable:       17  39865 Therapeutic Activity (timed):  use of dynamic activities replicating functional movements to increase ROM, strength, coordination, balance, and proprioception in order to improve patient's ability to progress to PLOF and address remaining functional goals. (see flow sheet as applicable)     Details if applicable:     40 32 [facility] Totals Reminder: bill using total billable min of TIMED therapeutic procedures (example: do not include dry needle or estim unattended, both untimed codes, in totals to left)  8-22 min = 1 unit; 23-37 min = 2 units; 38-52 min = 3 units; 53-67 min = 4 units; 68-82 min = 5 units   Total Total     [x]  Patient Education billed concurrently with other procedures   [x] Review HEP    [] Progressed/Changed HEP, detail:    [] Other detail:       Objective Information/Functional Measures/Assessment    Patient is demonstrating improved activity tolerance.  Advanced strengthening exercise and she was challenged with activities prescribed. Will continue to advance exercise as tolerated. Patient will continue to benefit from skilled PT / OT services to modify and progress therapeutic interventions, analyze and address functional mobility deficits, analyze and address ROM deficits, analyze and address strength deficits, analyze and address soft tissue restrictions, analyze and cue for proper movement patterns, analyze and modify for postural abnormalities, analyze and address imbalance/dizziness, and instruct in home and community integration to address functional deficits and attain remaining goals. Progress toward goals / Updated goals:  []  See Progress Note/Recertification    Short Term Goals: To be accomplished in 6 weeks:                 Patient will report compliance with HEP at least 1x/day to aid in rehabilitation program.                 Status at IE: Provided initial HEP                 Current: Met, 3/2/2023                    Patient will display right shoulder pain free AROM 140 degrees into flexion to aid in completion of ADLs. Status at IE: PROM 90 degrees                 Current: In-progress, AROM 132 degrees, 3/2/2023     Long Term Goals: To be accomplished in 12 weeks:                 Patient will report compliance with HEP a least 3-4x/week to aid in rehabilitation/strengthening program.                 Status at IE: NA                 Current:Same as IE                    Patient will report no pain greater than 1-2/10 with overhead activities to aid in completion of ADLs. Status at IE: 0-6/10                 Current: Met, Pain currently in 0-2/10 range 3/7/2023                    Patient will increase right UE strength to 4/5 throughout all planes to aid in completion of ADLs. Status at IE: unable to test secondary to rehabilitation protocol                 Current:  In-progress, 3+/5, 3/2/2023                    Patient will increase FOTO score to 58 points overall to demonstrate improvement in functional status.                   Status at IE: FOTO score = 32 (an established functional score where 100 = no disability)                 Current: Same as IE     PLAN  Yes  Continue plan of care  []  Upgrade activities as tolerated  []  Discharge due to :  []  Other:    Asmita Gonzales, LEODAN    3/7/2023    10:43 AM    Future Appointments   Date Time Provider Pierce Martinez   3/7/2023 11:00 AM Marge Donovan PT [facility]   3/9/2023 11:00 AM Dunia Weaver THE [facility]   3/14/2023 11:00 AM Marge Donovan PT [facility]   3/16/2023 11:00 AM Marge Donovan PT [facility]   3/21/2023 11:00 AM Marge Donovan PT [facility]   3/23/2023 11:00 AM Marge Donovan PT [facility]   3/28/2023 11:00 AM Marge Donovan PT [facility]   3/30/2023 11:00 AM Greg Saleh THE [facility]

## 2023-03-09 ENCOUNTER — HOSPITAL ENCOUNTER (OUTPATIENT)
Facility: HOSPITAL | Age: 78
Setting detail: RECURRING SERIES
Discharge: HOME | End: 2023-03-12
Payer: MEDICARE

## 2023-03-09 PROCEDURE — 97530 THERAPEUTIC ACTIVITIES: CPT

## 2023-03-09 PROCEDURE — 97112 NEUROMUSCULAR REEDUCATION: CPT

## 2023-03-09 PROCEDURE — 97110 THERAPEUTIC EXERCISES: CPT

## 2023-03-09 NOTE — PROGRESS NOTES
PHYSICAL / OCCUPATIONAL THERAPY - DAILY TREATMENT NOTE (updated )    Patient Name: Ollie Hughes    Date: 3/9/2023    : 1945  Insurance: Payor: MEDICARE / Plan: MEDICARE PART A AND B / Product Type: *No Product type* /      Patient  verified Yes   Visit #   Current / Total 10 24   Time   In / Out 1050 1130   Pain   In / Out 0 0   Subjective Functional Status/Changes: Patient reports that she is compliant with HEP. She states that she had muscle soreness after last visit but feels fine today. Changes to:  Meds, Allergies, Med Hx, Sx Hx? If yes, update Summary List no       TREATMENT AREA =  Pain in right shoulder [M25.511]    OBJECTIVE    Therapeutic Procedures: Tx Min Billable or 1:1 Min (if diff from Tx Min) Procedure, Rationale, Specifics   20  53243 Therapeutic Exercise (timed):  increase ROM, strength, coordination, balance, and proprioception to improve patient's ability to progress to PLOF and address remaining functional goals. (see flow sheet as applicable)     Details if applicable:       10  36724 Therapeutic Activity (timed):  use of dynamic activities replicating functional movements to increase ROM, strength, coordination, balance, and proprioception in order to improve patient's ability to progress to PLOF and address remaining functional goals. (see flow sheet as applicable)     Details if applicable:     10  07692 Neuromuscular Re-Education (timed):  improve balance, coordination, kinesthetic sense, posture, core stability and proprioception to improve patient's ability to develop conscious control of individual muscles and awareness of position of extremities in order to progress to PLOF and address remaining functional goals.  (see flow sheet as applicable)     Details if applicable:     36  [facility] Totals Reminder: bill using total billable min of TIMED therapeutic procedures (example: do not include dry needle or estim unattended, both untimed codes, in totals to left)  8-22 min = 1 unit; 23-37 min = 2 units; 38-52 min = 3 units; 53-67 min = 4 units; 68-82 min = 5 units   Total Total     [x]  Patient Education billed concurrently with other procedures   [x] Review HEP    [] Progressed/Changed HEP, detail:    [] Other detail:       Objective Information/Functional Measures/Assessment    Provided cues for recall of exercise mechanics and parameters. She was challenged with [place] press with wand. Will continue to focus on strength and functional mobility in future visits. Patient will continue to benefit from skilled PT / OT services to modify and progress therapeutic interventions, analyze and address functional mobility deficits, analyze and address ROM deficits, analyze and address strength deficits, analyze and address soft tissue restrictions, analyze and cue for proper movement patterns, analyze and modify for postural abnormalities, and instruct in home and community integration to address functional deficits and attain remaining goals. Progress toward goals / Updated goals:  []  See Progress Note/Recertification    Short Term Goals: To be accomplished in 6 weeks:                 Patient will report compliance with HEP at least 1x/day to aid in rehabilitation program.                 Status at IE: Provided initial HEP                 Current: Met, 3/2/2023                    Patient will display right shoulder pain free AROM 140 degrees into flexion to aid in completion of ADLs. Status at IE: PROM 90 degrees                 Current: In-progress, AROM 132 degrees, 3/2/2023     Long Term Goals: To be accomplished in 12 weeks:                 Patient will report compliance with HEP a least 3-4x/week to aid in rehabilitation/strengthening program.                 Status at IE: NA                 Current:Same as IE                    Patient will report no pain greater than 1-2/10 with overhead activities to aid in completion of ADLs.                  Status at IE: 0-6/10                 Current: Met, Pain currently in 0-2/10 range 3/7/2023                    Patient will increase right UE strength to 4/5 throughout all planes to aid in completion of ADLs. Status at IE: unable to test secondary to rehabilitation protocol                 Current: In-progress, 3+/5, 3/2/2023                    Patient will increase FOTO score to 58 points overall to demonstrate improvement in functional status. Status at IE: FOTO score = 32 (an established functional score where 100 = no disability)                 Current:  In-progress, 55, 3/9/2023     PLAN  Yes  Continue plan of care  []  Upgrade activities as tolerated  []  Discharge due to :  []  Other:    Mejia Kramer, PT    3/9/2023    11:06 AM    Future Appointments   Date Time Provider Pierce Martinez   3/14/2023 11:00 AM LEODAN Bro THE [facility]   3/16/2023 11:00 AM LEODAN Bro THE [facility]   3/21/2023 11:00 AM LEODAN BroHPELIZABETH THE [facility]   3/23/2023 11:00 AM LEODAN [facility]PTBRENDA THE [facility]   3/28/2023 11:00 AM LEODAN BroHPELIZABETH THE [facility]   3/30/2023 11:00 AM Chuck Wood THE [facility]

## 2023-03-14 ENCOUNTER — HOSPITAL ENCOUNTER (OUTPATIENT)
Facility: HOSPITAL | Age: 78
Setting detail: RECURRING SERIES
Discharge: HOME | End: 2023-03-17
Payer: MEDICARE

## 2023-03-14 PROCEDURE — 97530 THERAPEUTIC ACTIVITIES: CPT

## 2023-03-14 PROCEDURE — 97112 NEUROMUSCULAR REEDUCATION: CPT

## 2023-03-14 PROCEDURE — 97110 THERAPEUTIC EXERCISES: CPT

## 2023-03-16 ENCOUNTER — HOSPITAL ENCOUNTER (OUTPATIENT)
Facility: HOSPITAL | Age: 78
Setting detail: RECURRING SERIES
Discharge: HOME | End: 2023-03-19
Payer: MEDICARE

## 2023-03-16 PROCEDURE — 97530 THERAPEUTIC ACTIVITIES: CPT

## 2023-03-16 PROCEDURE — 97110 THERAPEUTIC EXERCISES: CPT

## 2023-03-16 PROCEDURE — 97112 NEUROMUSCULAR REEDUCATION: CPT

## 2023-03-16 NOTE — PROGRESS NOTES
unit; 23-37 min = 2 units; 38-52 min = 3 units; 53-67 min = 4 units; 68-82 min = 5 units   Total Total     [x]  Patient Education billed concurrently with other procedures   [x] Review HEP    [] Progressed/Changed HEP, detail:    [] Other detail:       Objective Information/Functional Measures/Assessment    Patient required cues for activity pacing. Provided tactile cues to promote proper mechanics with IR/ER exercise. She continues to tire quickly with shoulder flexion and [place] press. Will continue to develop strength in future visits. Patient will continue to benefit from skilled PT / OT services to modify and progress therapeutic interventions, analyze and address functional mobility deficits, analyze and address ROM deficits, analyze and address strength deficits, analyze and address soft tissue restrictions, analyze and cue for proper movement patterns, analyze and modify for postural abnormalities, and instruct in home and community integration to address functional deficits and attain remaining goals. Progress toward goals / Updated goals:  []  See Progress Note/Recertification    Short Term Goals: To be accomplished in 6 weeks:                 Patient will report compliance with HEP at least 1x/day to aid in rehabilitation program.                 Status at IE: Provided initial HEP                 Current: Met, 3/2/2023                    Patient will display right shoulder pain free AROM 140 degrees into flexion to aid in completion of ADLs. Status at IE: PROM 90 degrees                 Current: In-progress, AROM 132 degrees, 3/2/2023     Long Term Goals:  To be accomplished in 12 weeks:                 Patient will report compliance with HEP a least 3-4x/week to aid in rehabilitation/strengthening program.                 Status at IE: NA                 Current:Same as IE                    Patient will report no pain greater than 1-2/10 with overhead activities to aid in completion of

## 2023-03-21 ENCOUNTER — APPOINTMENT (OUTPATIENT)
Facility: HOSPITAL | Age: 78
End: 2023-03-21
Payer: MEDICARE

## 2023-03-23 ENCOUNTER — APPOINTMENT (OUTPATIENT)
Facility: HOSPITAL | Age: 78
End: 2023-03-23
Payer: MEDICARE

## 2023-03-28 ENCOUNTER — HOSPITAL ENCOUNTER (OUTPATIENT)
Facility: HOSPITAL | Age: 78
Setting detail: RECURRING SERIES
Discharge: HOME | End: 2023-03-31
Payer: MEDICARE

## 2023-03-28 PROCEDURE — 97530 THERAPEUTIC ACTIVITIES: CPT

## 2023-03-28 PROCEDURE — 97110 THERAPEUTIC EXERCISES: CPT

## 2023-03-28 NOTE — PROGRESS NOTES
demonstrate improvement in functional status. Status at IE: FOTO score = 32 (an established functional score where 100 = no disability)                 Current:  In-progress, 55, 3/9/2023       PLAN  Yes  Continue plan of care  []  Upgrade activities as tolerated  []  Discharge due to :  []  Other:    Elisa Rivers, PT    3/28/2023    11:09 AM    Future Appointments   Date Time Provider Pierce Martinez   3/30/2023 11:00 AM Ericka Dumont [facility]

## 2023-03-30 ENCOUNTER — HOSPITAL ENCOUNTER (OUTPATIENT)
Facility: HOSPITAL | Age: 78
Setting detail: RECURRING SERIES
End: 2023-03-30
Payer: MEDICARE

## 2023-03-30 PROCEDURE — 97530 THERAPEUTIC ACTIVITIES: CPT

## 2023-03-30 PROCEDURE — 97110 THERAPEUTIC EXERCISES: CPT

## 2023-03-30 NOTE — PROGRESS NOTES
Physical Therapy Discharge Instructions    [facility] [address] [place] [address]  Phone: [phone]   Fax: [phone]      Patient: Oneida Stanley  : 1945    Continue Home Exercise Program 3-4 times per week        Follow up with MD:     [] Upon completion of therapy     [x] As needed      Recommendations:     [x]   Return to activity with home program    [x]   Return to activity with the following modifications:       [x]Post Rehab Program    []Join Independent aquatic program     []Return to/join local gym      Additional Comments: Please contact clinic at above phone number if you have any questions regarding Home Exercise Program or self care instructions.

## 2023-03-30 NOTE — PROGRESS NOTES
PHYSICAL / OCCUPATIONAL THERAPY - DAILY TREATMENT NOTE (updated )    Patient Name: Luke Gomez    Date: 3/30/2023    : 1945  Insurance: Payor: MEDICARE / Plan: MEDICARE PART A AND B / Product Type: *No Product type* /      Patient  verified Yes   Visit #   Current / Total 14 24   Time   In / Out 1100 1130   Pain   In / Out 0 0   Subjective Functional Status/Changes: Patient reports that she would like to be discharged. She states that she is happy with her progress and plans to progress toward her remaining goals with independent exercise. Changes to:  Meds, Allergies, Med Hx, Sx Hx? If yes, update Summary List no       TREATMENT AREA =  Pain in right shoulder [M25.511]    OBJECTIVE      Therapeutic Procedures: Tx Min Billable or 1:1 Min (if diff from Tx Min) Procedure, Rationale, Specifics   20  41887 Therapeutic Exercise (timed):  increase ROM, strength, coordination, balance, and proprioception to improve patient's ability to progress to PLOF and address remaining functional goals. (see flow sheet as applicable)     Details if applicable:       10  39794 Therapeutic Activity (timed):  use of dynamic activities replicating functional movements to increase ROM, strength, coordination, balance, and proprioception in order to improve patient's ability to progress to PLOF and address remaining functional goals.   (see flow sheet as applicable)     Details if applicable:     27  [facility] Totals Reminder: bill using total billable min of TIMED therapeutic procedures (example: do not include dry needle or estim unattended, both untimed codes, in totals to left)  8-22 min = 1 unit; 23-37 min = 2 units; 38-52 min = 3 units; 53-67 min = 4 units; 68-82 min = 5 units   Total Total     [x]  Patient Education billed concurrently with other procedures   [x] Review HEP    [] Progressed/Changed HEP, detail:    [] Other detail:       Objective Information/Functional Measures/Assessment    Patient is being 3/30/203       PLAN  No  Continue plan of care  []  Upgrade activities as tolerated  [x]  Discharge due to : Goals met  []  Other:    Willie Muñiz, PT    3/30/2023    11:13 AM    No future appointments.

## 2023-03-30 NOTE — PROGRESS NOTES
Patient will increase FOTO score to 58 points overall to demonstrate improvement in functional status. Status at IE: FOTO score = 32 (an established functional score where 100 = no disability)                 Current: Met, 83, 3/30/203    Assessment/ Summary of Care: Tiffany Rodriguez is a 76 y.o. female s/p right total reverse shoulder replacement. Patient is being discharged a she has met or partially met 100% of her short and long term goals. She has developed strength and functional mobility and denies pain with ADLs. She is compliant with HEP and plans to continue to progressing toward her strength goals with independent exercise. Provided advanced HEP and resistance band to promote seamless transition to independent exercise .      RECOMMENDATIONS:  [x]Discontinue therapy: [x]Patient has reached or is progressing toward set goals      []Patient is non-compliant or has abdicated      []Due to lack of appreciable progress towards set goals    Alf Duenas, PT 3/30/2023 1:30 PM

## 2024-01-10 NOTE — PROGRESS NOTES
PHYSICAL / OCCUPATIONAL THERAPY - DAILY TREATMENT NOTE (updated )    Patient Name: Marleny Currie    Date: 3/14/2023    : 1945  Insurance: Payor: MEDICARE / Plan: MEDICARE PART A AND B / Product Type: *No Product type* /      Patient  verified Yes   Visit #   Current / Total 11 24   Time   In / Out 1055 1133   Pain   In / Out 0 0   Subjective Functional Status/Changes: Patient reports that she is doing well and is compliant with HEP. She states that she does not have pain only soreness.    Changes to:  Meds, Allergies, Med Hx, Sx Hx?  If yes, update Summary List no       TREATMENT AREA =  Pain in right shoulder [M25.511]    OBJECTIVE      Therapeutic Procedures:  Tx Min Billable or 1:1 Min (if diff from Tx Min) Procedure, Rationale, Specifics   20  73277 Therapeutic Exercise (timed):  increase ROM, strength, coordination, balance, and proprioception to improve patient's ability to progress to PLOF and address remaining functional goals. (see flow sheet as applicable)     Details if applicable:       10  07004 Therapeutic Activity (timed):  use of dynamic activities replicating functional movements to increase ROM, strength, coordination, balance, and proprioception in order to improve patient's ability to progress to PLOF and address remaining functional goals.  (see flow sheet as applicable)     Details if applicable:     8  78460 Neuromuscular Re-Education (timed):  improve balance, coordination, kinesthetic sense, posture, core stability and proprioception to improve patient's ability to develop conscious control of individual muscles and awareness of position of extremities in order to progress to PLOF and address remaining functional goals. (see flow sheet as applicable)     Details if applicable:     38  [facility] Totals Reminder: bill using total billable min of TIMED therapeutic procedures (example: do not include dry needle or estim unattended, both untimed codes, in totals to left)  8-22 min = 1  unit; 23-37 min = 2 units; 38-52 min = 3 units; 53-67 min = 4 units; 68-82 min = 5 units   Total Total     [x]  Patient Education billed concurrently with other procedures   [x] Review HEP    [] Progressed/Changed HEP, detail:    [] Other detail:       Objective Information/Functional Measures/Assessment    Advanced exercise by increasing resistance. She was challenged with exercise prescribed. Provided cues for activity pacing improving exercise tolerance. Will advance exercise as tolerated. Patient will continue to benefit from skilled PT / OT services to modify and progress therapeutic interventions, analyze and address functional mobility deficits, analyze and address ROM deficits, analyze and address strength deficits, analyze and address soft tissue restrictions, analyze and cue for proper movement patterns, analyze and modify for postural abnormalities, analyze and address imbalance/dizziness, and instruct in home and community integration to address functional deficits and attain remaining goals. Progress toward goals / Updated goals:  []  See Progress Note/Recertification    Short Term Goals: To be accomplished in 6 weeks:                 Patient will report compliance with HEP at least 1x/day to aid in rehabilitation program.                 Status at IE: Provided initial HEP                 Current: Met, 3/2/2023                    Patient will display right shoulder pain free AROM 140 degrees into flexion to aid in completion of ADLs. Status at IE: PROM 90 degrees                 Current: In-progress, AROM 132 degrees, 3/2/2023     Long Term Goals:  To be accomplished in 12 weeks:                 Patient will report compliance with HEP a least 3-4x/week to aid in rehabilitation/strengthening program.                 Status at IE: NA                 Current:Same as IE                    Patient will report no pain greater than 1-2/10 with overhead activities to aid in completion of ADLs.                 Status at IE: 0-6/10                 Current: Met, Pain currently in 0-2/10 range 3/7/2023                    Patient will increase right UE strength to 4/5 throughout all planes to aid in completion of ADLs. Status at IE: unable to test secondary to rehabilitation protocol                 Current: In-progress, 3+/5, 3/2/2023                    Patient will increase FOTO score to 58 points overall to demonstrate improvement in functional status. Status at IE: FOTO score = 32 (an established functional score where 100 = no disability)                 Current:  In-progress, 55, 3/9/2023    PLAN  Yes  Continue plan of care  []  Upgrade activities as tolerated  []  Discharge due to :  []  Other:    Reji Colby, LEODAN    3/14/2023    10:42 AM    Future Appointments   Date Time Provider Pierce Martinez   3/14/2023 11:00 AM Dunia Tariq THE [facility]   3/16/2023 11:00 AM LEODAN Tariq THE [facility]   3/21/2023 11:00 AM LEODAN Tariq THE [facility]   3/23/2023 11:00 AM LEODNA Tariq THE [facility]   3/28/2023 11:00 AM LEODAN Tariq THE [facility]   3/30/2023 11:00 AM Gavi Meyer THE [facility] no

## 2025-08-06 ENCOUNTER — HOSPITAL ENCOUNTER (OUTPATIENT)
Facility: HOSPITAL | Age: 80
Setting detail: RECURRING SERIES
Discharge: HOME | End: 2025-08-09
Payer: MEDICARE

## 2025-08-06 PROCEDURE — 97161 PT EVAL LOW COMPLEX 20 MIN: CPT

## 2025-08-06 PROCEDURE — 97110 THERAPEUTIC EXERCISES: CPT

## 2025-08-12 ENCOUNTER — HOSPITAL ENCOUNTER (OUTPATIENT)
Facility: HOSPITAL | Age: 80
Setting detail: RECURRING SERIES
Discharge: HOME | End: 2025-08-15
Payer: MEDICARE

## 2025-08-12 PROCEDURE — 97113 AQUATIC THERAPY/EXERCISES: CPT

## 2025-08-14 ENCOUNTER — HOSPITAL ENCOUNTER (OUTPATIENT)
Facility: HOSPITAL | Age: 80
Setting detail: RECURRING SERIES
Discharge: HOME | End: 2025-08-17
Payer: MEDICARE

## 2025-08-14 PROCEDURE — 97113 AQUATIC THERAPY/EXERCISES: CPT

## 2025-08-19 ENCOUNTER — HOSPITAL ENCOUNTER (OUTPATIENT)
Facility: HOSPITAL | Age: 80
Setting detail: RECURRING SERIES
Discharge: HOME | End: 2025-08-22
Payer: MEDICARE

## 2025-08-19 PROCEDURE — 97113 AQUATIC THERAPY/EXERCISES: CPT

## 2025-08-21 ENCOUNTER — APPOINTMENT (OUTPATIENT)
Facility: HOSPITAL | Age: 80
End: 2025-08-21
Payer: MEDICARE

## 2025-08-21 ENCOUNTER — TELEPHONE (OUTPATIENT)
Facility: HOSPITAL | Age: 80
End: 2025-08-21

## 2025-08-26 ENCOUNTER — HOSPITAL ENCOUNTER (OUTPATIENT)
Facility: HOSPITAL | Age: 80
Setting detail: RECURRING SERIES
Discharge: HOME | End: 2025-08-29
Payer: MEDICARE

## 2025-08-26 PROCEDURE — 97113 AQUATIC THERAPY/EXERCISES: CPT

## 2025-08-28 ENCOUNTER — TELEPHONE (OUTPATIENT)
Facility: HOSPITAL | Age: 80
End: 2025-08-28

## 2025-09-02 ENCOUNTER — HOSPITAL ENCOUNTER (OUTPATIENT)
Facility: HOSPITAL | Age: 80
Setting detail: RECURRING SERIES
Discharge: HOME | End: 2025-09-05
Payer: MEDICARE

## 2025-09-02 PROCEDURE — 97113 AQUATIC THERAPY/EXERCISES: CPT

## 2025-09-04 ENCOUNTER — TELEPHONE (OUTPATIENT)
Facility: HOSPITAL | Age: 80
End: 2025-09-04

## (undated) DEVICE — SUT VCRL + 0 27IN CT2 UD --

## (undated) DEVICE — STRYKER PERFORMANCE SERIES SAGITTAL BLADE: Brand: STRYKER PERFORMANCE SERIES

## (undated) DEVICE — SINGLE PORT MANIFOLD: Brand: NEPTUNE 2

## (undated) DEVICE — KIT CHAIR TRIMANO FOAM W/ SUPP ARM DRP ERGONOMICALLY DESIGNED

## (undated) DEVICE — REM POLYHESIVE ADULT PATIENT RETURN ELECTRODE: Brand: VALLEYLAB

## (undated) DEVICE — SUT VCRL + 2-0 36IN CT1 UD --

## (undated) DEVICE — GARMENT,MEDLINE,DVT,INT,CALF,MED, GEN2: Brand: MEDLINE

## (undated) DEVICE — DRESSING,GAUZE,XEROFORM,CURAD,1"X8",ST: Brand: CURAD

## (undated) DEVICE — SOLUTION IV 500ML 0.9% SOD CHL FLX CONT

## (undated) DEVICE — SUTURE MCRYL SZ 3-0 L27IN ABSRB UD PS-2 3/8 CIR REV CUT NDL MCP427H

## (undated) DEVICE — Z INACTIVE USE 2854097 SPONGE GZ W4XL4IN COT 12 PLY TYP VII WVN C FLD DSGN

## (undated) DEVICE — 3M™ TEGADERM™ TRANSPARENT FILM DRESSING FRAME STYLE, 1626W, 4 IN X 4-3/4 IN (10 CM X 12 CM), 50/CT 4CT/CASE: Brand: 3M™ TEGADERM™

## (undated) DEVICE — PACK PROCEDURE SURG TOT SHLDR CUST

## (undated) DEVICE — PREP SKN CHLRAPRP APL 26ML STR --

## (undated) DEVICE — STRIP,CLOSURE,WOUND,MEDI-STRIP,1/2X4: Brand: MEDLINE

## (undated) DEVICE — SOLUTION LACTATED RINGERS INJECTION USP

## (undated) DEVICE — IMMOBILIZER SHLDR L W8XL18IN COT ADJUSTABLE  SHLDR STRP W  A

## (undated) DEVICE — NEEDLE HYPO 21GA L1.5IN INTRAMUSCULAR S STL LATCH BVL UP

## (undated) DEVICE — GUIDE DRL PT MATCHED GLEN RSP

## (undated) DEVICE — 3M™ STERI-DRAPE™ U-DRAPE 1015: Brand: STERI-DRAPE™